# Patient Record
Sex: FEMALE | Race: WHITE | NOT HISPANIC OR LATINO | Employment: OTHER | URBAN - METROPOLITAN AREA
[De-identification: names, ages, dates, MRNs, and addresses within clinical notes are randomized per-mention and may not be internally consistent; named-entity substitution may affect disease eponyms.]

---

## 2019-01-21 NOTE — PROCEDURE NOTE: CLINICAL
PROCEDURE NOTE: Focal Laser, Retina OD. Diagnosis: Diabetic Macular Edema. Anesthesia: Topical. Prior to focal laser, risks/benefits/alternatives to laser discussed including loss of vision and patient wished to proceed. An informed consent was obtained and no assurances or guarantees were given. Spot size: 100 um. Power: 90 mW. Number of pulses: 56. Pulse duration: 70 ms. Procedure Time: 12:08 PM. Patient tolerated procedure well. There were no complications. Post procedure instructions given. Patient given office phone number/answering service number and advised to call immediately should there be loss of vision or pain, or should they have any other questions or concerns. Mckenna Canter

## 2019-01-30 NOTE — PROCEDURE NOTE: CLINICAL
PROCEDURE NOTE: Focal Laser, Retina OS. Diagnosis: Diabetic Macular Edema. Anesthesia: Topical. Prior to focal laser, risks/benefits/alternatives to laser discussed including loss of vision and patient wished to proceed. An informed consent was obtained and no assurances or guarantees were given. Spot size: 100 um. Power: 70 mW. Number of pulses: 63. Pulse duration: 70 ms. Procedure Time: 0393S. Patient tolerated procedure well. There were no complications. Post procedure instructions given. Patient given office phone number/answering service number and advised to call immediately should there be loss of vision or pain, or should they have any other questions or concerns. Lars Kaur

## 2019-05-22 NOTE — PROCEDURE NOTE: CLINICAL
PROCEDURE NOTE: Focal Laser, Retina OS. Diagnosis: Diabetic Macular Edema. Anesthesia: Topical. Prior to focal laser, risks/benefits/alternatives to laser discussed including loss of vision and patient wished to proceed. An informed consent was obtained and no assurances or guarantees were given. Spot size: 100* um. Power: 90* mW. Number of pulses:92 *. Pulse duration:80 * ms. Procedure Time: 1121AM*. Patient tolerated procedure well. There were no complications. Post procedure instructions given. Patient given office phone number/answering service number and advised to call immediately should there be loss of vision or pain, or should they have any other questions or concerns. Rachelle Cortes

## 2019-06-25 NOTE — PROCEDURE NOTE: CLINICAL
PROCEDURE NOTE: Focal Laser, Retina OD. Diagnosis: Diabetic Macular Edema. Anesthesia: Topical. Prior to focal laser, risks/benefits/alternatives to laser discussed including loss of vision and patient wished to proceed. An informed consent was obtained and no assurances or guarantees were given. Spot size: 100* um. Power: 60* mW. Number of pulses: 112*. Pulse duration:70 * ms. Procedure Time:248PM *. Patient tolerated procedure well. There were no complications. Post procedure instructions given. Patient given office phone number/answering service number and advised to call immediately should there be loss of vision or pain, or should they have any other questions or concerns. Rachelle Cortes

## 2019-10-16 NOTE — PROCEDURE NOTE: CLINICAL
PROCEDURE NOTE: Focal Laser, Retina OS. Diagnosis: Diabetic Macular Edema. Anesthesia: Topical. Prior to focal laser, risks/benefits/alternatives to laser discussed including loss of vision and patient wished to proceed. An informed consent was obtained and no assurances or guarantees were given. Spot size: 80* um. Power: 80* mW. Number of pulses: 89*. Pulse duration: 100* ms. Procedure Time: 4:28PM*. Patient tolerated procedure well. There were no complications. Post procedure instructions given. Patient given office phone number/answering service number and advised to call immediately should there be loss of vision or pain, or should they have any other questions or concerns. Marisol Landon

## 2020-01-28 ENCOUNTER — NEW REFERRAL (OUTPATIENT)
Dept: URBAN - METROPOLITAN AREA CLINIC 26 | Facility: CLINIC | Age: 70
End: 2020-01-28

## 2020-01-28 VITALS
HEART RATE: 76 BPM | HEIGHT: 67 IN | DIASTOLIC BLOOD PRESSURE: 66 MMHG | BODY MASS INDEX: 22.76 KG/M2 | SYSTOLIC BLOOD PRESSURE: 122 MMHG | WEIGHT: 145 LBS

## 2020-01-28 DIAGNOSIS — H35.3211: ICD-10-CM

## 2020-01-28 DIAGNOSIS — E11.9: ICD-10-CM

## 2020-01-28 DIAGNOSIS — H43.813: ICD-10-CM

## 2020-01-28 DIAGNOSIS — H35.3122: ICD-10-CM

## 2020-01-28 DIAGNOSIS — H35.711: ICD-10-CM

## 2020-01-28 DIAGNOSIS — H35.371: ICD-10-CM

## 2020-01-28 DIAGNOSIS — D31.31: ICD-10-CM

## 2020-01-28 PROCEDURE — 67028 INJECTION EYE DRUG: CPT

## 2020-01-28 PROCEDURE — 99204 OFFICE O/P NEW MOD 45 MIN: CPT

## 2020-01-28 PROCEDURE — 92250 FUNDUS PHOTOGRAPHY W/I&R: CPT

## 2020-01-28 PROCEDURE — 92235 FLUORESCEIN ANGRPH MLTIFRAME: CPT

## 2020-01-28 PROCEDURE — 92134 CPTRZ OPH DX IMG PST SGM RTA: CPT

## 2020-01-28 ASSESSMENT — VISUAL ACUITY
OS_SC: 20/20+1
OD_CC: CF 1FT
OD_SC: CF 2FT
OS_CC: 20/15

## 2020-01-28 ASSESSMENT — TONOMETRY
OD_IOP_MMHG: 13
OS_IOP_MMHG: 15

## 2020-02-07 NOTE — PROCEDURE NOTE: CLINICAL
PROCEDURE NOTE: Focal Laser, Retina OD. Diagnosis: Diabetic Macular Edema. Anesthesia: Lidocaine 4%. Prior to focal laser, risks/benefits/alternatives to laser discussed including loss of vision and patient wished to proceed. An informed consent was obtained and no assurances or guarantees were given. Spot size: 50 um. Power: 90 mW. Number of pulses: 19. Pulse duration: 5 0 ms. Procedure Time: 6:23 . Patient tolerated procedure well. There were no complications. Post procedure instructions given. Patient given office phone number/answering service number and advised to call immediately should there be loss of vision or pain, or should they have any other questions or concerns. Matthew Steel

## 2020-02-14 NOTE — PROCEDURE NOTE: CLINICAL
PROCEDURE NOTE: Focal Laser, Retina OS. Diagnosis: Diabetes, Type II with Ocular Complications. Anesthesia: Topical. Prior to focal laser, risks/benefits/alternatives to laser discussed including loss of vision and patient wished to proceed. An informed consent was obtained and no assurances or guarantees were given. Spot size: 50 um. Power: 70-90 mW. Number of pulses: 30 . Pulse duration: 50 ms. Procedure Time: 2412M. Patient tolerated procedure well. There were no complications. Post procedure instructions given. Patient given office phone number/answering service number and advised to call immediately should there be loss of vision or pain, or should they have any other questions or concerns. Jesus Osorio

## 2020-03-03 ENCOUNTER — CLINICAL PROCEDURE AND DIAGNOSTIC TESTING ONLY (OUTPATIENT)
Dept: URBAN - METROPOLITAN AREA CLINIC 26 | Facility: CLINIC | Age: 70
End: 2020-03-03

## 2020-03-03 DIAGNOSIS — H35.3211: ICD-10-CM

## 2020-03-03 DIAGNOSIS — H35.3122: ICD-10-CM

## 2020-03-03 PROCEDURE — 67028 INJECTION EYE DRUG: CPT

## 2020-03-03 PROCEDURE — 92250 FUNDUS PHOTOGRAPHY W/I&R: CPT

## 2020-03-03 ASSESSMENT — TONOMETRY: OD_IOP_MMHG: 12

## 2020-03-03 ASSESSMENT — VISUAL ACUITY: OD_CC: CF 2FT

## 2020-04-06 ENCOUNTER — CLINICAL PROCEDURE AND DIAGNOSTIC TESTING ONLY (OUTPATIENT)
Dept: URBAN - METROPOLITAN AREA CLINIC 26 | Facility: CLINIC | Age: 70
End: 2020-04-06

## 2020-04-06 DIAGNOSIS — D31.31: ICD-10-CM

## 2020-04-06 DIAGNOSIS — H35.3122: ICD-10-CM

## 2020-04-06 DIAGNOSIS — H35.3211: ICD-10-CM

## 2020-04-06 PROCEDURE — 92134 CPTRZ OPH DX IMG PST SGM RTA: CPT

## 2020-04-06 PROCEDURE — 92250 FUNDUS PHOTOGRAPHY W/I&R: CPT

## 2020-04-06 PROCEDURE — 67028 INJECTION EYE DRUG: CPT

## 2020-04-06 ASSESSMENT — VISUAL ACUITY: OD_SC: CF 2FT

## 2020-04-06 ASSESSMENT — TONOMETRY: OD_IOP_MMHG: 14

## 2020-05-05 ENCOUNTER — FOLLOW UP AND POST INJECTION EVALUATION (OUTPATIENT)
Dept: URBAN - METROPOLITAN AREA CLINIC 26 | Facility: CLINIC | Age: 70
End: 2020-05-05

## 2020-05-05 DIAGNOSIS — H35.371: ICD-10-CM

## 2020-05-05 DIAGNOSIS — H43.813: ICD-10-CM

## 2020-05-05 DIAGNOSIS — H35.711: ICD-10-CM

## 2020-05-05 DIAGNOSIS — D31.31: ICD-10-CM

## 2020-05-05 DIAGNOSIS — H35.3211: ICD-10-CM

## 2020-05-05 DIAGNOSIS — H35.3122: ICD-10-CM

## 2020-05-05 DIAGNOSIS — E11.9: ICD-10-CM

## 2020-05-05 PROCEDURE — 92250 FUNDUS PHOTOGRAPHY W/I&R: CPT

## 2020-05-05 PROCEDURE — 67028 INJECTION EYE DRUG: CPT

## 2020-05-05 PROCEDURE — 92134 CPTRZ OPH DX IMG PST SGM RTA: CPT

## 2020-05-05 PROCEDURE — 92014 COMPRE OPH EXAM EST PT 1/>: CPT

## 2020-05-05 ASSESSMENT — TONOMETRY
OD_IOP_MMHG: 14
OS_IOP_MMHG: 15

## 2020-05-05 ASSESSMENT — VISUAL ACUITY
OS_CC: 20/20-2
OD_CC: CF 2FT

## 2020-07-09 ENCOUNTER — CLINICAL PROCEDURE AND DIAGNOSTIC TESTING ONLY (OUTPATIENT)
Dept: URBAN - METROPOLITAN AREA CLINIC 26 | Facility: CLINIC | Age: 70
End: 2020-07-09

## 2020-07-09 DIAGNOSIS — H35.371: ICD-10-CM

## 2020-07-09 DIAGNOSIS — H35.3211: ICD-10-CM

## 2020-07-09 DIAGNOSIS — H35.3122: ICD-10-CM

## 2020-07-09 PROCEDURE — 92250 FUNDUS PHOTOGRAPHY W/I&R: CPT

## 2020-07-09 PROCEDURE — 92134 CPTRZ OPH DX IMG PST SGM RTA: CPT

## 2020-07-09 PROCEDURE — 67028 INJECTION EYE DRUG: CPT

## 2020-07-09 ASSESSMENT — VISUAL ACUITY: OD_CC: CF 2FT

## 2020-07-09 ASSESSMENT — TONOMETRY: OD_IOP_MMHG: 9

## 2020-07-30 NOTE — PATIENT DISCUSSION
BLEPHAROSPAMS:  I have explained the nature of this disease process including its pathogenesis, prognosis, and treatment options. For most patients medical therapy is of minimal or no value. For most patients periodic injections of Botox provides significant benefit. I have explained how Botox works and that it is not a cure. Most patients require repeated injections every 3 months. Risks, benefits and alternative of botox discussed with patient. Risks include but are not limited to pain, bleeding, infection, bruising, failure to treat and may cause paralysis. Patient understands and wish to proceed with treatment.

## 2020-07-30 NOTE — PATIENT DISCUSSION
Instructed patient to get Zaditor or Pataday over the counter drops. Instructed patient up them in several times a day. To try that for a week and follow up if its not any better.

## 2020-09-03 ENCOUNTER — CLINICAL PROCEDURE AND DIAGNOSTIC TESTING ONLY (OUTPATIENT)
Dept: URBAN - METROPOLITAN AREA CLINIC 26 | Facility: CLINIC | Age: 70
End: 2020-09-03

## 2020-09-03 DIAGNOSIS — H35.3211: ICD-10-CM

## 2020-09-03 DIAGNOSIS — H35.3122: ICD-10-CM

## 2020-09-03 PROCEDURE — 67028 INJECTION EYE DRUG: CPT

## 2020-09-03 PROCEDURE — 92134 CPTRZ OPH DX IMG PST SGM RTA: CPT

## 2020-09-03 PROCEDURE — 92250 FUNDUS PHOTOGRAPHY W/I&R: CPT

## 2020-09-03 ASSESSMENT — VISUAL ACUITY: OD_CC: 20/400-2

## 2020-09-03 ASSESSMENT — TONOMETRY: OD_IOP_MMHG: 12

## 2020-09-23 NOTE — PATIENT DISCUSSION
Recommended OBSERVATION and MONITORING for progression. shortness of breathe, worsening cough, chills, fevers

## 2020-10-15 ENCOUNTER — OFFICE VISIT (OUTPATIENT)
Dept: URBAN - METROPOLITAN AREA CLINIC 121 | Facility: CLINIC | Age: 70
End: 2020-10-15

## 2020-10-29 ENCOUNTER — FOLLOW UP AND POST INJECTION EVALUATION (OUTPATIENT)
Dept: URBAN - METROPOLITAN AREA CLINIC 26 | Facility: CLINIC | Age: 70
End: 2020-10-29

## 2020-10-29 DIAGNOSIS — E11.9: ICD-10-CM

## 2020-10-29 DIAGNOSIS — H35.371: ICD-10-CM

## 2020-10-29 DIAGNOSIS — H35.3122: ICD-10-CM

## 2020-10-29 DIAGNOSIS — H35.3211: ICD-10-CM

## 2020-10-29 DIAGNOSIS — H43.813: ICD-10-CM

## 2020-10-29 DIAGNOSIS — H35.711: ICD-10-CM

## 2020-10-29 DIAGNOSIS — D31.31: ICD-10-CM

## 2020-10-29 PROCEDURE — 92014 COMPRE OPH EXAM EST PT 1/>: CPT

## 2020-10-29 PROCEDURE — 92134 CPTRZ OPH DX IMG PST SGM RTA: CPT

## 2020-10-29 PROCEDURE — 92250 FUNDUS PHOTOGRAPHY W/I&R: CPT

## 2020-10-29 PROCEDURE — 67028 INJECTION EYE DRUG: CPT

## 2020-10-29 ASSESSMENT — TONOMETRY
OD_IOP_MMHG: 12
OS_IOP_MMHG: 13

## 2020-10-29 ASSESSMENT — VISUAL ACUITY
OS_CC: 20/20
OD_CC: 20/400-1

## 2020-12-10 ENCOUNTER — CLINICAL PROCEDURE AND DIAGNOSTIC TESTING ONLY (OUTPATIENT)
Dept: URBAN - METROPOLITAN AREA CLINIC 26 | Facility: CLINIC | Age: 70
End: 2020-12-10

## 2020-12-10 DIAGNOSIS — H35.3122: ICD-10-CM

## 2020-12-10 DIAGNOSIS — H35.371: ICD-10-CM

## 2020-12-10 DIAGNOSIS — H35.3211: ICD-10-CM

## 2020-12-10 PROCEDURE — 92134 CPTRZ OPH DX IMG PST SGM RTA: CPT

## 2020-12-10 PROCEDURE — 67028 INJECTION EYE DRUG: CPT

## 2020-12-10 PROCEDURE — 92250 FUNDUS PHOTOGRAPHY W/I&R: CPT

## 2020-12-10 ASSESSMENT — TONOMETRY: OD_IOP_MMHG: 13

## 2020-12-10 ASSESSMENT — VISUAL ACUITY: OD_CC: 20/400-1

## 2021-01-20 NOTE — PROCEDURE NOTE: CLINICAL
PROCEDURE NOTE: Focal Laser, Retina OS. Diagnosis: Diabetic Macular Edema. Anesthesia: Topical. Prior to focal laser, risks/benefits/alternatives to laser discussed including loss of vision and patient wished to proceed. An informed consent was obtained and no assurances or guarantees were given. Spot size:100 * um. Power: * mW. Number of pulses: 143*. Pulse duration: 70* ms. Procedure Time: 227PM*. Patient tolerated procedure well. There were no complications. Post procedure instructions given. Patient given office phone number/answering service number and advised to call immediately should there be loss of vision or pain, or should they have any other questions or concerns. Becky Collins

## 2021-01-25 ENCOUNTER — CLINICAL PROCEDURE AND DIAGNOSTIC TESTING ONLY (OUTPATIENT)
Dept: URBAN - METROPOLITAN AREA CLINIC 26 | Facility: CLINIC | Age: 71
End: 2021-01-25

## 2021-01-25 DIAGNOSIS — H35.3122: ICD-10-CM

## 2021-01-25 DIAGNOSIS — H35.3211: ICD-10-CM

## 2021-01-25 PROCEDURE — 92250 FUNDUS PHOTOGRAPHY W/I&R: CPT

## 2021-01-25 PROCEDURE — 67028 INJECTION EYE DRUG: CPT

## 2021-01-25 PROCEDURE — 92134 CPTRZ OPH DX IMG PST SGM RTA: CPT

## 2021-01-25 ASSESSMENT — TONOMETRY: OD_IOP_MMHG: 12

## 2021-01-25 ASSESSMENT — VISUAL ACUITY: OD_SC: 20/400-1

## 2021-02-03 ENCOUNTER — OFFICE VISIT (OUTPATIENT)
Dept: URBAN - METROPOLITAN AREA CLINIC 63 | Facility: CLINIC | Age: 71
End: 2021-02-03

## 2021-02-04 ENCOUNTER — OFFICE VISIT (OUTPATIENT)
Dept: URBAN - METROPOLITAN AREA CLINIC 60 | Facility: CLINIC | Age: 71
End: 2021-02-04

## 2021-03-05 NOTE — PROCEDURE NOTE: CLINICAL
PROCEDURE NOTE: Focal Laser, Retina OD. Diagnosis: Diabetic Macular Edema. Anesthesia: Topical. Prior to focal laser, risks/benefits/alternatives to laser discussed including loss of vision and patient wished to proceed. An informed consent was obtained and no assurances or guarantees were given. Spot size: 100* um. Power: 90* mW. Number of pulses: 55*. Pulse duration:70 * ms. Procedure Time: 332PM*. Patient tolerated procedure well. There were no complications. Post procedure instructions given. Patient given office phone number/answering service number and advised to call immediately should there be loss of vision or pain, or should they have any other questions or concerns. Nathen Arias
4

## 2021-03-08 ENCOUNTER — CLINICAL PROCEDURE AND DIAGNOSTIC TESTING ONLY (OUTPATIENT)
Dept: URBAN - METROPOLITAN AREA CLINIC 26 | Facility: CLINIC | Age: 71
End: 2021-03-08

## 2021-03-08 DIAGNOSIS — H35.3122: ICD-10-CM

## 2021-03-08 DIAGNOSIS — H35.3211: ICD-10-CM

## 2021-03-08 PROCEDURE — 92250 FUNDUS PHOTOGRAPHY W/I&R: CPT

## 2021-03-08 PROCEDURE — 67028 INJECTION EYE DRUG: CPT

## 2021-03-08 ASSESSMENT — VISUAL ACUITY: OD_SC: 20/400-2

## 2021-03-08 ASSESSMENT — TONOMETRY: OD_IOP_MMHG: 15

## 2021-04-15 ENCOUNTER — FOLLOW UP AND POST INJECTION EVALUATION (OUTPATIENT)
Dept: URBAN - METROPOLITAN AREA CLINIC 26 | Facility: CLINIC | Age: 71
End: 2021-04-15

## 2021-04-15 VITALS — HEIGHT: 67 IN | BODY MASS INDEX: 22.76 KG/M2 | WEIGHT: 145 LBS

## 2021-04-15 DIAGNOSIS — D31.31: ICD-10-CM

## 2021-04-15 DIAGNOSIS — H43.813: ICD-10-CM

## 2021-04-15 DIAGNOSIS — H35.3122: ICD-10-CM

## 2021-04-15 DIAGNOSIS — H35.371: ICD-10-CM

## 2021-04-15 DIAGNOSIS — H35.3211: ICD-10-CM

## 2021-04-15 DIAGNOSIS — E11.9: ICD-10-CM

## 2021-04-15 DIAGNOSIS — H35.711: ICD-10-CM

## 2021-04-15 PROCEDURE — 92250 FUNDUS PHOTOGRAPHY W/I&R: CPT

## 2021-04-15 PROCEDURE — 92134 CPTRZ OPH DX IMG PST SGM RTA: CPT

## 2021-04-15 PROCEDURE — 92014 COMPRE OPH EXAM EST PT 1/>: CPT

## 2021-04-15 PROCEDURE — 67028 INJECTION EYE DRUG: CPT

## 2021-04-15 ASSESSMENT — TONOMETRY
OD_IOP_MMHG: 12
OS_IOP_MMHG: 12

## 2021-04-15 ASSESSMENT — VISUAL ACUITY
OS_CC: 20/20
OD_CC: CF 2FT

## 2021-05-20 ENCOUNTER — CLINICAL PROCEDURE AND DIAGNOSTIC TESTING ONLY (OUTPATIENT)
Dept: URBAN - METROPOLITAN AREA CLINIC 26 | Facility: CLINIC | Age: 71
End: 2021-05-20

## 2021-05-20 DIAGNOSIS — H35.3211: ICD-10-CM

## 2021-05-20 DIAGNOSIS — H35.3122: ICD-10-CM

## 2021-05-20 DIAGNOSIS — H35.371: ICD-10-CM

## 2021-05-20 PROCEDURE — 67028 INJECTION EYE DRUG: CPT

## 2021-05-20 PROCEDURE — 92250 FUNDUS PHOTOGRAPHY W/I&R: CPT

## 2021-05-20 PROCEDURE — 92134 CPTRZ OPH DX IMG PST SGM RTA: CPT

## 2021-05-20 ASSESSMENT — VISUAL ACUITY: OD_CC: 20/400-2

## 2021-05-20 ASSESSMENT — TONOMETRY: OD_IOP_MMHG: 13

## 2021-06-23 NOTE — PROCEDURE NOTE: CLINICAL
PROCEDURE NOTE: Focal Laser, Retina OD. Diagnosis: Diabetic Macular Edema. Anesthesia: Topical. Prior to focal laser, risks/benefits/alternatives to laser discussed including loss of vision and patient wished to proceed. An informed consent was obtained and no assurances or guarantees were given. Spot size: 100 um. Power: 80 mW. Number of pulses: 97. Pulse duration: 80 ms. Procedure Time: 2:31PM. Patient tolerated procedure well. There were no complications. Post procedure instructions given. Patient given office phone number/answering service number and advised to call immediately should there be loss of vision or pain, or should they have any other questions or concerns. Orlando Javier

## 2021-06-28 ENCOUNTER — CLINICAL PROCEDURE AND DIAGNOSTIC TESTING ONLY (OUTPATIENT)
Dept: URBAN - METROPOLITAN AREA CLINIC 26 | Facility: CLINIC | Age: 71
End: 2021-06-28

## 2021-06-28 DIAGNOSIS — H35.3122: ICD-10-CM

## 2021-06-28 DIAGNOSIS — H35.3211: ICD-10-CM

## 2021-06-28 DIAGNOSIS — H35.371: ICD-10-CM

## 2021-06-28 PROCEDURE — 92250 FUNDUS PHOTOGRAPHY W/I&R: CPT

## 2021-06-28 PROCEDURE — 67028 INJECTION EYE DRUG: CPT

## 2021-06-28 ASSESSMENT — TONOMETRY
OD_IOP_MMHG: 14
OS_IOP_MMHG: 12

## 2021-06-28 ASSESSMENT — VISUAL ACUITY
OD_CC: 20/400+1
OS_CC: 20/20

## 2021-07-14 NOTE — PROCEDURE NOTE: CLINICAL
PROCEDURE NOTE: Focal Laser, Retina OS. Diagnosis: Diabetic Macular Edema. Anesthesia: Topical. Prior to focal laser, risks/benefits/alternatives to laser discussed including loss of vision and patient wished to proceed. An informed consent was obtained and no assurances or guarantees were given. Spot size: 100* um. Power: 80* mW. Number of pulses: 80*. Pulse duration: 80* ms. Procedure Time: *. Patient tolerated procedure well. There were no complications. Post procedure instructions given. Patient given office phone number/answering service number and advised to call immediately should there be loss of vision or pain, or should they have any other questions or concerns. Rosalina Stubbs

## 2021-08-04 ENCOUNTER — CLINICAL PROCEDURE AND DIAGNOSTIC TESTING ONLY (OUTPATIENT)
Dept: URBAN - METROPOLITAN AREA CLINIC 26 | Facility: CLINIC | Age: 71
End: 2021-08-04

## 2021-08-04 DIAGNOSIS — H35.3122: ICD-10-CM

## 2021-08-04 DIAGNOSIS — H35.3211: ICD-10-CM

## 2021-08-04 DIAGNOSIS — H35.371: ICD-10-CM

## 2021-08-04 PROCEDURE — 92134 CPTRZ OPH DX IMG PST SGM RTA: CPT

## 2021-08-04 PROCEDURE — 92250 FUNDUS PHOTOGRAPHY W/I&R: CPT

## 2021-08-04 PROCEDURE — 67028 INJECTION EYE DRUG: CPT

## 2021-08-04 ASSESSMENT — TONOMETRY: OD_IOP_MMHG: 15

## 2021-08-04 ASSESSMENT — VISUAL ACUITY: OD_SC: 20/400-1

## 2021-09-08 ENCOUNTER — FOLLOW UP AND POST INJECTION EVALUATION (OUTPATIENT)
Dept: URBAN - METROPOLITAN AREA CLINIC 26 | Facility: CLINIC | Age: 71
End: 2021-09-08

## 2021-09-08 DIAGNOSIS — H35.711: ICD-10-CM

## 2021-09-08 DIAGNOSIS — D31.31: ICD-10-CM

## 2021-09-08 DIAGNOSIS — H35.371: ICD-10-CM

## 2021-09-08 DIAGNOSIS — H35.3211: ICD-10-CM

## 2021-09-08 DIAGNOSIS — H35.3122: ICD-10-CM

## 2021-09-08 DIAGNOSIS — E11.9: ICD-10-CM

## 2021-09-08 DIAGNOSIS — H02.836: ICD-10-CM

## 2021-09-08 DIAGNOSIS — H43.813: ICD-10-CM

## 2021-09-08 PROCEDURE — 92134 CPTRZ OPH DX IMG PST SGM RTA: CPT

## 2021-09-08 PROCEDURE — 67028 INJECTION EYE DRUG: CPT

## 2021-09-08 PROCEDURE — 92250 FUNDUS PHOTOGRAPHY W/I&R: CPT

## 2021-09-08 PROCEDURE — 92014 COMPRE OPH EXAM EST PT 1/>: CPT

## 2021-09-08 ASSESSMENT — VISUAL ACUITY
OD_CC: 20/400
OS_CC: 20/20

## 2021-09-08 ASSESSMENT — TONOMETRY
OD_IOP_MMHG: 13
OS_IOP_MMHG: 14

## 2021-10-13 ENCOUNTER — CLINICAL PROCEDURE AND DIAGNOSTIC TESTING ONLY (OUTPATIENT)
Dept: URBAN - METROPOLITAN AREA CLINIC 26 | Facility: CLINIC | Age: 71
End: 2021-10-13

## 2021-10-13 DIAGNOSIS — H35.371: ICD-10-CM

## 2021-10-13 DIAGNOSIS — H35.3122: ICD-10-CM

## 2021-10-13 DIAGNOSIS — H35.3211: ICD-10-CM

## 2021-10-13 PROCEDURE — 92134 CPTRZ OPH DX IMG PST SGM RTA: CPT

## 2021-10-13 PROCEDURE — 92250 FUNDUS PHOTOGRAPHY W/I&R: CPT

## 2021-10-13 PROCEDURE — 67028 INJECTION EYE DRUG: CPT

## 2021-10-13 ASSESSMENT — VISUAL ACUITY: OD_CC: 20/400

## 2021-10-13 ASSESSMENT — TONOMETRY: OD_IOP_MMHG: 10

## 2021-11-17 ENCOUNTER — CLINICAL PROCEDURE AND DIAGNOSTIC TESTING ONLY (OUTPATIENT)
Dept: URBAN - METROPOLITAN AREA CLINIC 26 | Facility: CLINIC | Age: 71
End: 2021-11-17

## 2021-11-17 DIAGNOSIS — H35.3122: ICD-10-CM

## 2021-11-17 DIAGNOSIS — H35.3211: ICD-10-CM

## 2021-11-17 DIAGNOSIS — D31.31: ICD-10-CM

## 2021-11-17 DIAGNOSIS — H35.371: ICD-10-CM

## 2021-11-17 PROCEDURE — 67028 INJECTION EYE DRUG: CPT

## 2021-11-17 PROCEDURE — 92250 FUNDUS PHOTOGRAPHY W/I&R: CPT

## 2021-11-17 PROCEDURE — 92134 CPTRZ OPH DX IMG PST SGM RTA: CPT

## 2021-11-17 ASSESSMENT — TONOMETRY: OD_IOP_MMHG: 13

## 2021-11-17 ASSESSMENT — VISUAL ACUITY: OD_CC: CF 3FT

## 2021-12-21 NOTE — PROCEDURE NOTE: CLINICAL
PROCEDURE NOTE: Focal Laser, Retina OD. Diagnosis: Diabetic Macular Edema. Anesthesia: Topical. Prior to focal laser, risks/benefits/alternatives to laser discussed including loss of vision and patient wished to proceed. An informed consent was obtained and no assurances or guarantees were given. Spot size: *100 um. Power: 100* mW. Number of pulses: 124*. Pulse duration: 80* ms. Procedure Time: 224PM*. Patient tolerated procedure well. There were no complications. Post procedure instructions given. Patient given office phone number/answering service number and advised to call immediately should there be loss of vision or pain, or should they have any other questions or concerns. Koko Hobbs

## 2022-01-12 ENCOUNTER — CLINIC PROCEDURE ONLY (OUTPATIENT)
Dept: URBAN - METROPOLITAN AREA CLINIC 26 | Facility: CLINIC | Age: 72
End: 2022-01-12

## 2022-01-12 DIAGNOSIS — H35.3122: ICD-10-CM

## 2022-01-12 DIAGNOSIS — H35.3211: ICD-10-CM

## 2022-01-12 PROCEDURE — 92250 FUNDUS PHOTOGRAPHY W/I&R: CPT

## 2022-01-12 PROCEDURE — 92134 CPTRZ OPH DX IMG PST SGM RTA: CPT

## 2022-01-12 PROCEDURE — 67028 INJECTION EYE DRUG: CPT

## 2022-01-12 ASSESSMENT — TONOMETRY: OD_IOP_MMHG: 12

## 2022-01-12 ASSESSMENT — VISUAL ACUITY: OD_SC: 20/400

## 2022-02-04 NOTE — PROCEDURE NOTE: CLINICAL
PROCEDURE NOTE: Avastin () OS. Diagnosis: Diabetic Macular Edema. Anesthesia: Lidocaine 4%. Prep: Betadine Drops. Prior to injection, risks/benefits/alternatives discussed including infection, loss of vision, hemorrhage, cataract, glaucoma, retinal tears or detachment. The off-label status of Intravitreal Avastin also was reviewed. The patient wished to proceed with treatment. Topical anesthesia was induced with Alcaine. Additional anesthesia was achieved using drop(s) or injection checked above. a drop of Povidone-iodine 5% ophthalmic solution was instilled over the injection site and in the inferior fornix. Betadine prep was performed. Using the syringe provided, Avastin 1.25 mg in 0.05 cc was injected into the vitreous cavity. The needle was passed 3.0 mm posterior to the limbus in pseudophakic patients, and 3.5 mm posterior to the lumbus in phakic patients. The remainder of the Avastin in the single-use vial was then discarded in a medical waste disposal container. Unused medication was discarded. Patient tolerated procedure well. There were no complications. Injection time: *. Post-op instructions given. Expiration Date: 5/3/2022. The patient was instructed to return for re-evaluation in approximately 4-12 weeks depending on his/her condition and was told to call immediately if vision decreases and/or if his/her eye becomes red, painful, and/or light sensitive. The patient was instructed to go to the emergency room or call 911 if unable to reach the doctor within an hour or two of trying or calling. The patient was instructed to use Artificial Tears q.i.d. p.r.n for comfort. Natanael Marley

## 2022-03-09 ENCOUNTER — CLINIC PROCEDURE ONLY (OUTPATIENT)
Dept: URBAN - METROPOLITAN AREA CLINIC 26 | Facility: CLINIC | Age: 72
End: 2022-03-09

## 2022-03-09 DIAGNOSIS — H35.3211: ICD-10-CM

## 2022-03-09 DIAGNOSIS — H35.3122: ICD-10-CM

## 2022-03-09 PROCEDURE — 92134 CPTRZ OPH DX IMG PST SGM RTA: CPT

## 2022-03-09 PROCEDURE — 67028 INJECTION EYE DRUG: CPT

## 2022-03-09 PROCEDURE — 92250 FUNDUS PHOTOGRAPHY W/I&R: CPT

## 2022-03-09 ASSESSMENT — TONOMETRY: OD_IOP_MMHG: 12

## 2022-03-16 NOTE — PROCEDURE NOTE: CLINICAL
PROCEDURE NOTE: Avastin () OS. Diagnosis: Diabetic Macular Edema. Anesthesia: Lidocaine 4%. Prep: Betadine Drops. Prior to injection, risks/benefits/alternatives discussed including infection, loss of vision, hemorrhage, cataract, glaucoma, retinal tears or detachment. The off-label status of Intravitreal Avastin also was reviewed. The patient wished to proceed with treatment. Topical anesthesia was induced with Alcaine. Additional anesthesia was achieved using drop(s) or injection checked above. a drop of Povidone-iodine 5% ophthalmic solution was instilled over the injection site and in the inferior fornix. Betadine prep was performed. Using the syringe provided, Avastin 1.25 mg in 0.05 cc was injected into the vitreous cavity. The needle was passed 3.0 mm posterior to the limbus in pseudophakic patients, and 3.5 mm posterior to the lumbus in phakic patients. The remainder of the Avastin in the single-use vial was then discarded in a medical waste disposal container. Unused medication was discarded. Patient tolerated procedure well. There were no complications. Injection time: *. Post-op instructions given. Expiration Date: 6/19/2022. The patient was instructed to return for re-evaluation in approximately 4-12 weeks depending on his/her condition and was told to call immediately if vision decreases and/or if his/her eye becomes red, painful, and/or light sensitive. The patient was instructed to go to the emergency room or call 911 if unable to reach the doctor within an hour or two of trying or calling. The patient was instructed to use Artificial Tears q.i.d. p.r.n for comfort. Blue Choi

## 2022-04-28 ENCOUNTER — LAB OUTSIDE AN ENCOUNTER (OUTPATIENT)
Dept: URBAN - METROPOLITAN AREA CLINIC 121 | Facility: CLINIC | Age: 72
End: 2022-04-28

## 2022-05-03 ENCOUNTER — CLINIC PROCEDURE ONLY (OUTPATIENT)
Dept: URBAN - METROPOLITAN AREA CLINIC 26 | Facility: CLINIC | Age: 72
End: 2022-05-03

## 2022-05-03 VITALS — BODY MASS INDEX: 22.76 KG/M2 | HEIGHT: 67 IN | WEIGHT: 145 LBS

## 2022-05-03 DIAGNOSIS — H43.813: ICD-10-CM

## 2022-05-03 DIAGNOSIS — H35.371: ICD-10-CM

## 2022-05-03 DIAGNOSIS — H35.3122: ICD-10-CM

## 2022-05-03 DIAGNOSIS — E11.9: ICD-10-CM

## 2022-05-03 DIAGNOSIS — H35.3211: ICD-10-CM

## 2022-05-03 DIAGNOSIS — D31.31: ICD-10-CM

## 2022-05-03 DIAGNOSIS — H04.123: ICD-10-CM

## 2022-05-03 DIAGNOSIS — H35.711: ICD-10-CM

## 2022-05-03 PROCEDURE — 67028 INJECTION EYE DRUG: CPT

## 2022-05-03 PROCEDURE — 92250 FUNDUS PHOTOGRAPHY W/I&R: CPT

## 2022-05-03 PROCEDURE — 92014 COMPRE OPH EXAM EST PT 1/>: CPT

## 2022-05-03 PROCEDURE — 92134 CPTRZ OPH DX IMG PST SGM RTA: CPT

## 2022-05-03 ASSESSMENT — TONOMETRY
OS_IOP_MMHG: 11
OD_IOP_MMHG: 11

## 2022-05-03 ASSESSMENT — VISUAL ACUITY: OS_CC: 20/30-1

## 2022-05-04 NOTE — PROCEDURE NOTE: CLINICAL
PROCEDURE NOTE: Avastin () OS. Diagnosis: Diabetic Macular Edema. Anesthesia: Lidocaine 4%. Prep: Betadine Drops. Prior to injection, risks/benefits/alternatives discussed including infection, loss of vision, hemorrhage, cataract, glaucoma, retinal tears or detachment. The off-label status of Intravitreal Avastin also was reviewed. The patient wished to proceed with treatment. Topical anesthesia was induced with Alcaine. Additional anesthesia was achieved using drop(s) or injection checked above. a drop of Povidone-iodine 5% ophthalmic solution was instilled over the injection site and in the inferior fornix. Betadine prep was performed. Using the syringe provided, Avastin 1.25 mg in 0.05 cc was injected into the vitreous cavity. The needle was passed 3.0 mm posterior to the limbus in pseudophakic patients, and 3.5 mm posterior to the lumbus in phakic patients. The remainder of the Avastin in the single-use vial was then discarded in a medical waste disposal container. Unused medication was discarded. Patient tolerated procedure well. There were no complications. Injection time: *. Post-op instructions given. Expiration Date: 7/29/2022. The patient was instructed to return for re-evaluation in approximately 4-12 weeks depending on his/her condition and was told to call immediately if vision decreases and/or if his/her eye becomes red, painful, and/or light sensitive. The patient was instructed to go to the emergency room or call 911 if unable to reach the doctor within an hour or two of trying or calling. The patient was instructed to use Artificial Tears q.i.d. p.r.n for comfort. Clifton Garcia

## 2022-05-10 NOTE — PROCEDURE NOTE: CLINICAL
PROCEDURE NOTE: Focal Laser, Retina OD. Diagnosis: Diabetic Macular Edema. Anesthesia: Topical. Prior to focal laser, risks/benefits/alternatives to laser discussed including loss of vision and patient wished to proceed. An informed consent was obtained and no assurances or guarantees were given. Spot size: 100* um. Power: 110* mW. Number of pulses:118 *. Pulse duration: 70* ms. Procedure Time: 331PM*. Patient tolerated procedure well. There were no complications. Post procedure instructions given. Patient given office phone number/answering service number and advised to call immediately should there be loss of vision or pain, or should they have any other questions or concerns. Sherine Farris

## 2022-05-20 LAB
ABSOLUTE BASOPHILS: (no result)
ABSOLUTE EOSINOPHILS: (no result)
ABSOLUTE LYMPHOCYTES: (no result)
ABSOLUTE MONOCYTES: (no result)
ABSOLUTE NEUTROPHILS: (no result)
ALBUMIN/GLOBULIN RATIO: (no result)
ALBUMIN: (no result)
ALKALINE PHOSPHATASE: (no result)
ALPHA FETOPROTEIN, TUMOR MARKER: (no result)
ALT: (no result)
AMMONIA (P): (no result)
AST: (no result)
BASOPHILS: (no result)
BILIRUBIN, TOTAL: (no result)
BUN/CREATININE RATIO: (no result)
CALCIUM: (no result)
CARBON DIOXIDE: (no result)
CHLORIDE: (no result)
CREATININE: (no result)
EGFR AFRICAN AMERIC: (no result)
EGFR NON-AFR. AMERI: (no result)
EOSINOPHILS: (no result)
GLOBULIN: (no result)
GLUCOSE: (no result)
HEMATOCRIT: (no result)
HEMOGLOBIN: (no result)
INR: 0.9
LYMPHOCYTES: (no result)
MCH: (no result)
MCHC: (no result)
MCV: (no result)
MONOCYTES: (no result)
MPV: (no result)
NEUTROPHILS: (no result)
PLATELET COUNT: (no result)
POTASSIUM: (no result)
PROTEIN, TOTAL: (no result)
PT: (no result)
RDW: (no result)
RED BLOOD CELL COUNT: (no result)
SODIUM: (no result)
UREA NITROGEN (BUN): (no result)
WHITE BLOOD CELL COUNT: (no result)

## 2022-05-26 ENCOUNTER — OFFICE VISIT (OUTPATIENT)
Dept: URBAN - METROPOLITAN AREA CLINIC 60 | Facility: CLINIC | Age: 72
End: 2022-05-26

## 2022-06-02 ENCOUNTER — OFFICE VISIT (OUTPATIENT)
Dept: URBAN - METROPOLITAN AREA CLINIC 60 | Facility: CLINIC | Age: 72
End: 2022-06-02

## 2022-06-04 ENCOUNTER — TELEPHONE ENCOUNTER (OUTPATIENT)
Dept: URBAN - METROPOLITAN AREA CLINIC 68 | Facility: CLINIC | Age: 72
End: 2022-06-04

## 2022-06-05 ENCOUNTER — TELEPHONE ENCOUNTER (OUTPATIENT)
Dept: URBAN - METROPOLITAN AREA CLINIC 68 | Facility: CLINIC | Age: 72
End: 2022-06-05

## 2022-06-25 ENCOUNTER — TELEPHONE ENCOUNTER (OUTPATIENT)
Age: 72
End: 2022-06-25

## 2022-06-26 ENCOUNTER — TELEPHONE ENCOUNTER (OUTPATIENT)
Age: 72
End: 2022-06-26

## 2022-06-28 NOTE — PROCEDURE NOTE: CLINICAL
PROCEDURE NOTE: Avastin 1.25mg OS. Diagnosis: Diabetic Macular Edema. Anesthesia: Topical. Prep: Betadine Drops. Prior to injection, risks/benefits/alternatives discussed including infection, loss of vision, hemorrhage, cataract, glaucoma, retinal tears or detachment. The off-label status of Intravitreal Avastin also was reviewed. The patient wished to proceed with treatment. Topical anesthesia was induced with Alcaine. Additional anesthesia was achieved using drop(s) or injection checked above. A drop of Povidone-iodine 5% ophthalmic solution was instilled over the injection site and in the inferior fornix. Betadine prep was performed. Using the syringe provided, Avastin 1.25 mg in 0.05 cc was injected into the vitreous cavity. The needle was passed 3.0 mm posterior to the limbus in pseudophakic patients, and 3.5 mm posterior to the limbus in phakic patients. Patient tolerated procedure well. There were no complications. Injection time: 1:09. Lot #: [id number]. Expiration Date: 8/28/2022. Post-op instructions given. Inventory Id: null. The patient was instructed to return for re-evaluation in approximately 4-12 weeks depending on his/her condition and was told to call immediately if vision decreases and/or if his/her eye becomes red, painful, and/or light sensitive. The patient was instructed to go to the emergency room or call 911 if unable to reach the doctor within an hour or two of trying or calling. The patient was instructed to use Artificial Tears q.i.d. p.r.n for comfort. Orlando Javier

## 2022-07-09 ENCOUNTER — TELEPHONE ENCOUNTER (OUTPATIENT)
Dept: URBAN - METROPOLITAN AREA CLINIC 121 | Facility: CLINIC | Age: 72
End: 2022-07-09

## 2022-07-09 RX ORDER — PNV NO.95/FERROUS FUM/FOLIC AC 28MG-0.8MG
TABLET ORAL
Refills: 0 | OUTPATIENT
Start: 2019-08-07 | End: 2019-09-13

## 2022-07-09 RX ORDER — SIMVASTATIN 10 MG/1
TABLET, FILM COATED ORAL ONCE A DAY
Refills: 0 | OUTPATIENT
Start: 2019-08-07 | End: 2019-09-13

## 2022-07-09 RX ORDER — MULTIVIT,TX WITH IRON,MINERALS
TABLET, EXTENDED RELEASE ORAL ONCE A DAY
Refills: 0 | OUTPATIENT
Start: 2021-02-04 | End: 2022-06-02

## 2022-07-09 RX ORDER — PNV NO.95/FERROUS FUM/FOLIC AC 28MG-0.8MG
TABLET ORAL
Refills: 0 | OUTPATIENT
Start: 2019-06-04 | End: 2019-08-07

## 2022-07-09 RX ORDER — MULTIVIT,TX WITH IRON,MINERALS
TABLET, EXTENDED RELEASE ORAL ONCE A DAY
Refills: 0 | OUTPATIENT
Start: 2019-09-13 | End: 2021-02-04

## 2022-07-09 RX ORDER — TRIAMTERENE AND HYDROCHLOROTHIAZIDE 37.5; 25 MG/1; MG/1
CAPSULE ORAL ONCE A DAY
Refills: 0 | OUTPATIENT
Start: 2019-09-13 | End: 2021-02-04

## 2022-07-09 RX ORDER — ERGOCALCIFEROL (VITAMIN D2) 10 MCG
TABLET ORAL ONCE A DAY
Refills: 0 | OUTPATIENT
Start: 2021-02-04 | End: 2022-06-02

## 2022-07-09 RX ORDER — LISINOPRIL 5 MG/1
TABLET ORAL ONCE A DAY
Refills: 0 | OUTPATIENT
Start: 2019-08-07 | End: 2019-09-13

## 2022-07-09 RX ORDER — TRIAMTERENE AND HYDROCHLOROTHIAZIDE 37.5; 25 MG/1; MG/1
CAPSULE ORAL ONCE A DAY
Refills: 0 | OUTPATIENT
Start: 2019-06-04 | End: 2019-08-07

## 2022-07-09 RX ORDER — METFORMIN HCL 500 MG/1
TABLET ORAL ONCE A DAY
Refills: 0 | OUTPATIENT
Start: 2019-09-13 | End: 2021-02-04

## 2022-07-09 RX ORDER — SIMVASTATIN 10 MG/1
TABLET, FILM COATED ORAL ONCE A DAY
Refills: 0 | OUTPATIENT
Start: 2019-09-13 | End: 2021-02-04

## 2022-07-09 RX ORDER — LISINOPRIL 30 MG/1
TABLET ORAL ONCE A DAY
Refills: 0 | OUTPATIENT
Start: 2021-02-04 | End: 2022-06-02

## 2022-07-09 RX ORDER — LEVOTHYROXINE SODIUM 150 UG/1
TABLET ORAL ONCE A DAY
Refills: 0 | OUTPATIENT
Start: 2019-08-07 | End: 2019-09-13

## 2022-07-09 RX ORDER — URSODIOL 300 MG/1
TAKE TWO CAPS QAM AND ONE CAP QHS CAPSULE ORAL THREE TIMES A DAY
Refills: 0 | OUTPATIENT
Start: 2022-04-28 | End: 2022-06-02

## 2022-07-09 RX ORDER — ASPIRIN 325 MG/1
TABLET ORAL ONCE A DAY
Refills: 0 | OUTPATIENT
Start: 2021-02-04 | End: 2022-06-02

## 2022-07-09 RX ORDER — URSODIOL 300 MG/1
CAPSULE ORAL TAKE AS DIRECTED
Refills: 0 | OUTPATIENT
Start: 2019-06-04 | End: 2019-08-07

## 2022-07-09 RX ORDER — URSODIOL 300 MG/1
TAKE TWO CAPS QAM AND ONE CAP QHS CAPSULE ORAL THREE TIMES A DAY
Refills: 3 | OUTPATIENT
Start: 2019-08-07 | End: 2019-11-04

## 2022-07-09 RX ORDER — SIMVASTATIN 10 MG/1
TABLET, FILM COATED ORAL ONCE A DAY
Refills: 0 | OUTPATIENT
Start: 2019-06-04 | End: 2019-08-07

## 2022-07-09 RX ORDER — METFORMIN HCL 500 MG/1
TABLET ORAL ONCE A DAY
Refills: 0 | OUTPATIENT
Start: 2019-06-04 | End: 2019-08-07

## 2022-07-09 RX ORDER — METFORMIN HCL 500 MG/1
TABLET ORAL ONCE A DAY
Refills: 0 | OUTPATIENT
Start: 2019-08-07 | End: 2019-09-13

## 2022-07-09 RX ORDER — METFORMIN HCL 500 MG/1
TABLET ORAL ONCE A DAY
Refills: 0 | OUTPATIENT
Start: 2021-02-04 | End: 2022-06-02

## 2022-07-09 RX ORDER — LEVOTHYROXINE SODIUM 150 UG/1
TABLET ORAL ONCE A DAY
Refills: 0 | OUTPATIENT
Start: 2021-02-04 | End: 2022-06-02

## 2022-07-09 RX ORDER — MULTIVIT,TX WITH IRON,MINERALS
TABLET, EXTENDED RELEASE ORAL ONCE A DAY
Refills: 0 | OUTPATIENT
Start: 2019-06-04 | End: 2019-08-07

## 2022-07-09 RX ORDER — URSODIOL 300 MG/1
TAKE TWO CAPS QAM AND ONE CAP QHS CAPSULE ORAL THREE TIMES A DAY
Refills: 0 | OUTPATIENT
Start: 2022-01-11 | End: 2022-04-28

## 2022-07-09 RX ORDER — DILTIAZEM HYDROCHLORIDE 240 MG/1
CAPSULE, EXTENDED RELEASE ORAL ONCE A DAY
Refills: 0 | OUTPATIENT
Start: 2021-02-04 | End: 2022-06-02

## 2022-07-09 RX ORDER — URSODIOL 300 MG/1
TAKE TWO CAPS QAM AND ONE CAP QHS CAPSULE ORAL THREE TIMES A DAY
Refills: 3 | OUTPATIENT
Start: 2021-02-04 | End: 2022-01-11

## 2022-07-09 RX ORDER — MULTIVIT,TX WITH IRON,MINERALS
TABLET, EXTENDED RELEASE ORAL ONCE A DAY
Refills: 0 | OUTPATIENT
Start: 2019-08-07 | End: 2019-09-13

## 2022-07-09 RX ORDER — PNV NO.95/FERROUS FUM/FOLIC AC 28MG-0.8MG
TABLET ORAL
Refills: 0 | OUTPATIENT
Start: 2019-09-13 | End: 2021-02-04

## 2022-07-09 RX ORDER — ERGOCALCIFEROL (VITAMIN D2) 10 MCG
TABLET ORAL ONCE A DAY
Refills: 0 | OUTPATIENT
Start: 2019-09-13 | End: 2021-02-04

## 2022-07-09 RX ORDER — ASPIRIN 325 MG/1
TABLET ORAL ONCE A DAY
Refills: 0 | OUTPATIENT
Start: 2019-08-07 | End: 2019-09-13

## 2022-07-09 RX ORDER — URSODIOL 300 MG/1
CAPSULE ORAL TAKE AS DIRECTED
Refills: 0 | OUTPATIENT
Start: 2019-08-07 | End: 2019-09-13

## 2022-07-09 RX ORDER — TRIAMTERENE AND HYDROCHLOROTHIAZIDE 37.5; 25 MG/1; MG/1
CAPSULE ORAL ONCE A DAY
Refills: 0 | OUTPATIENT
Start: 2019-08-07 | End: 2019-09-13

## 2022-07-09 RX ORDER — LEVOTHYROXINE SODIUM 150 UG/1
TABLET ORAL ONCE A DAY
Refills: 0 | OUTPATIENT
Start: 2019-06-04 | End: 2019-08-07

## 2022-07-09 RX ORDER — LISINOPRIL 5 MG/1
TABLET ORAL ONCE A DAY
Refills: 0 | OUTPATIENT
Start: 2019-09-13 | End: 2021-02-04

## 2022-07-09 RX ORDER — URSODIOL 300 MG/1
TAKE TWO CAPS QAM AND ONE CAP QHS CAPSULE ORAL THREE TIMES A DAY
Refills: 0 | OUTPATIENT
Start: 2020-09-10 | End: 2021-02-04

## 2022-07-09 RX ORDER — PNV NO.95/FERROUS FUM/FOLIC AC 28MG-0.8MG
TABLET ORAL
Refills: 0 | OUTPATIENT
Start: 2021-02-04 | End: 2022-06-02

## 2022-07-09 RX ORDER — DILTIAZEM HYDROCHLORIDE 240 MG/1
CAPSULE, EXTENDED RELEASE ORAL ONCE A DAY
Refills: 0 | OUTPATIENT
Start: 2019-08-07 | End: 2019-09-13

## 2022-07-09 RX ORDER — ASPIRIN 325 MG/1
TABLET ORAL ONCE A DAY
Refills: 0 | OUTPATIENT
Start: 2019-09-13 | End: 2021-02-04

## 2022-07-09 RX ORDER — DILTIAZEM HYDROCHLORIDE 240 MG/1
CAPSULE, EXTENDED RELEASE ORAL ONCE A DAY
Refills: 0 | OUTPATIENT
Start: 2019-09-13 | End: 2021-02-04

## 2022-07-09 RX ORDER — ASPIRIN 325 MG/1
TABLET ORAL ONCE A DAY
Refills: 0 | OUTPATIENT
Start: 2019-07-15 | End: 2019-08-07

## 2022-07-09 RX ORDER — DILTIAZEM HYDROCHLORIDE 240 MG/1
CAPSULE, EXTENDED RELEASE ORAL ONCE A DAY
Refills: 0 | OUTPATIENT
Start: 2019-06-04 | End: 2019-08-07

## 2022-07-09 RX ORDER — LEVOTHYROXINE SODIUM 150 UG/1
TABLET ORAL ONCE A DAY
Refills: 0 | OUTPATIENT
Start: 2019-09-13 | End: 2021-02-04

## 2022-07-09 RX ORDER — ERGOCALCIFEROL (VITAMIN D2) 10 MCG
TABLET ORAL ONCE A DAY
Refills: 0 | OUTPATIENT
Start: 2019-06-04 | End: 2019-08-07

## 2022-07-09 RX ORDER — URSODIOL 300 MG/1
TAKE TWO CAPS QAM AND ONE CAP QHS CAPSULE ORAL THREE TIMES A DAY
Refills: 3 | OUTPATIENT
Start: 2019-11-04 | End: 2020-09-10

## 2022-07-09 RX ORDER — LISINOPRIL 5 MG/1
TABLET ORAL ONCE A DAY
Refills: 0 | OUTPATIENT
Start: 2019-06-04 | End: 2019-08-07

## 2022-07-09 RX ORDER — SIMVASTATIN 10 MG/1
TABLET, FILM COATED ORAL ONCE A DAY
Refills: 0 | OUTPATIENT
Start: 2021-02-04 | End: 2022-06-02

## 2022-07-09 RX ORDER — ERGOCALCIFEROL (VITAMIN D2) 10 MCG
TABLET ORAL ONCE A DAY
Refills: 0 | OUTPATIENT
Start: 2019-08-07 | End: 2019-09-13

## 2022-07-09 RX ORDER — TRIAMTERENE AND HYDROCHLOROTHIAZIDE 37.5; 25 MG/1; MG/1
CAPSULE ORAL ONCE A DAY
Refills: 0 | OUTPATIENT
Start: 2021-02-04 | End: 2021-02-04

## 2022-07-09 RX ORDER — LISINOPRIL 5 MG/1
TABLET ORAL ONCE A DAY
Refills: 0 | OUTPATIENT
Start: 2021-02-04 | End: 2021-02-04

## 2022-07-10 ENCOUNTER — TELEPHONE ENCOUNTER (OUTPATIENT)
Dept: URBAN - METROPOLITAN AREA CLINIC 121 | Facility: CLINIC | Age: 72
End: 2022-07-10

## 2022-07-10 RX ORDER — DILTIAZEM HYDROCHLORIDE 240 MG/1
CAPSULE, EXTENDED RELEASE ORAL ONCE A DAY
Refills: 0 | Status: ACTIVE | COMMUNITY
Start: 2022-06-02

## 2022-07-10 RX ORDER — FLECAINIDE ACETATE 100 MG/1
TABLET ORAL TAKE AS DIRECTED
Refills: 0 | Status: ACTIVE | COMMUNITY
Start: 2022-06-02

## 2022-07-10 RX ORDER — LISINOPRIL 30 MG/1
TABLET ORAL ONCE A DAY
Refills: 0 | Status: ACTIVE | COMMUNITY
Start: 2022-06-02

## 2022-07-10 RX ORDER — URSODIOL 300 MG/1
TAKE TWO CAPS QAM AND ONE CAP QHS CAPSULE ORAL THREE TIMES A DAY
Refills: 3 | Status: ACTIVE | COMMUNITY
Start: 2021-02-04

## 2022-07-10 RX ORDER — PNV NO.95/FERROUS FUM/FOLIC AC 28MG-0.8MG
TABLET ORAL
Refills: 0 | Status: ACTIVE | COMMUNITY
Start: 2022-06-02

## 2022-07-10 RX ORDER — SIMVASTATIN 10 MG/1
TABLET, FILM COATED ORAL ONCE A DAY
Refills: 0 | Status: ACTIVE | COMMUNITY
Start: 2022-06-02

## 2022-07-10 RX ORDER — ONDANSETRON 4 MG/1
TAKE AS DIRECTED TAKE FIRST TABLET 30 MIN PRIOR TO STARTING BOWEL PREP, THEN SEND TABLET 30 MIN PRIOR TO SECOND DOSE TABLET, ORALLY DISINTEGRATING ORAL TAKE AS DIRECTED
Refills: 0 | Status: ACTIVE | COMMUNITY
Start: 2019-08-07

## 2022-07-10 RX ORDER — ERGOCALCIFEROL (VITAMIN D2) 10 MCG
TABLET ORAL ONCE A DAY
Refills: 0 | Status: ACTIVE | COMMUNITY
Start: 2022-06-02

## 2022-07-10 RX ORDER — LEVOTHYROXINE SODIUM 150 UG/1
TABLET ORAL ONCE A DAY
Refills: 0 | Status: ACTIVE | COMMUNITY
Start: 2022-06-02

## 2022-07-10 RX ORDER — ASPIRIN 325 MG/1
TABLET ORAL ONCE A DAY
Refills: 0 | Status: ACTIVE | COMMUNITY
Start: 2022-06-02

## 2022-07-10 RX ORDER — AMMONIUM LACTATE 12 %
CREAM (GRAM) TOPICAL TAKE AS DIRECTED
Refills: 0 | Status: ACTIVE | COMMUNITY
Start: 2022-06-02

## 2022-07-10 RX ORDER — METFORMIN HCL 500 MG/1
TABLET ORAL ONCE A DAY
Refills: 0 | Status: ACTIVE | COMMUNITY
Start: 2022-06-02

## 2022-07-10 RX ORDER — HYDROCODONE BITARTRATE AND ACETAMINOPHEN 5; 325 MG/1; MG/1
PRN TABLET ORAL TAKE AS DIRECTED
Refills: 0 | Status: ACTIVE | COMMUNITY
Start: 2022-06-02

## 2022-07-10 RX ORDER — URSODIOL 300 MG/1
TAKE TWO CAPS QAM AND ONE CAP QHS CAPSULE ORAL THREE TIMES A DAY
Refills: 3 | Status: ACTIVE | COMMUNITY
Start: 2022-06-02

## 2022-07-12 ENCOUNTER — CLINIC PROCEDURE ONLY (OUTPATIENT)
Dept: URBAN - METROPOLITAN AREA CLINIC 26 | Facility: CLINIC | Age: 72
End: 2022-07-12

## 2022-07-12 DIAGNOSIS — H35.371: ICD-10-CM

## 2022-07-12 DIAGNOSIS — D31.31: ICD-10-CM

## 2022-07-12 DIAGNOSIS — H35.3122: ICD-10-CM

## 2022-07-12 DIAGNOSIS — H35.3211: ICD-10-CM

## 2022-07-12 PROCEDURE — 92134 CPTRZ OPH DX IMG PST SGM RTA: CPT

## 2022-07-12 PROCEDURE — 92250 FUNDUS PHOTOGRAPHY W/I&R: CPT

## 2022-07-12 PROCEDURE — 67028 INJECTION EYE DRUG: CPT

## 2022-07-12 ASSESSMENT — TONOMETRY: OD_IOP_MMHG: 12

## 2022-07-30 ENCOUNTER — TELEPHONE ENCOUNTER (OUTPATIENT)
Age: 72
End: 2022-07-30

## 2022-07-31 ENCOUNTER — TELEPHONE ENCOUNTER (OUTPATIENT)
Age: 72
End: 2022-07-31

## 2022-07-31 RX ORDER — URSODIOL 300 MG/1
3 (THREE) CAPSULE ORAL
Qty: 0 | Refills: 2 | Status: ACTIVE | COMMUNITY
Start: 2018-11-14

## 2022-07-31 RX ORDER — URSODIOL 300 MG/1
3 (THREE) CAPSULE ORAL
Qty: 0 | Refills: 3 | Status: ACTIVE | COMMUNITY
Start: 2018-10-25

## 2022-07-31 RX ORDER — URSODIOL 300 MG/1
1 (ONE) CAPSULE ORAL
Qty: 0 | Refills: 5 | Status: ACTIVE | COMMUNITY
Start: 2018-10-19

## 2022-07-31 RX ORDER — URSODIOL 300 MG/1
2 (TWO) CAPSULE ORAL
Qty: 0 | Refills: 4 | Status: ACTIVE | COMMUNITY
Start: 2018-10-22

## 2022-07-31 RX ORDER — URSODIOL 300 MG/1
3 (THREE) CAPSULE ORAL
Qty: 0 | Refills: 3 | Status: ACTIVE | COMMUNITY
Start: 2018-10-28

## 2022-08-23 NOTE — PROCEDURE NOTE: CLINICAL
PROCEDURE NOTE: Avastin 1.25mg OS. Diagnosis: Diabetic Macular Edema. Anesthesia: Lidocaine 4%. Prep: Betadine Drops. Prior to injection, risks/benefits/alternatives discussed including infection, loss of vision, hemorrhage, cataract, glaucoma, retinal tears or detachment. The off-label status of Intravitreal Avastin also was reviewed. The patient wished to proceed with treatment. Topical anesthesia was induced with Alcaine. Additional anesthesia was achieved using drop(s) or injection checked above. A drop of Povidone-iodine 5% ophthalmic solution was instilled over the injection site and in the inferior fornix. Betadine prep was performed. Using the syringe provided, Avastin 1.25 mg in 0.05 cc was injected into the vitreous cavity. The needle was passed 3.0 mm posterior to the limbus in pseudophakic patients, and 3.5 mm posterior to the limbus in phakic patients. Patient tolerated procedure well. There were no complications. Injection time: *. Lot #: [id number]. Expiration Date: 11/4/2022. Post-op instructions given. Inventory Id: null. The patient was instructed to return for re-evaluation in approximately 4-12 weeks depending on his/her condition and was told to call immediately if vision decreases and/or if his/her eye becomes red, painful, and/or light sensitive. The patient was instructed to go to the emergency room or call 911 if unable to reach the doctor within an hour or two of trying or calling. The patient was instructed to use Artificial Tears q.i.d. p.r.n for comfort. Lidia Sultana

## 2022-09-13 ENCOUNTER — CLINIC PROCEDURE ONLY (OUTPATIENT)
Dept: URBAN - METROPOLITAN AREA CLINIC 26 | Facility: CLINIC | Age: 72
End: 2022-09-13

## 2022-09-13 DIAGNOSIS — H35.3122: ICD-10-CM

## 2022-09-13 DIAGNOSIS — H35.371: ICD-10-CM

## 2022-09-13 DIAGNOSIS — H35.3211: ICD-10-CM

## 2022-09-13 PROCEDURE — 67028 INJECTION EYE DRUG: CPT

## 2022-09-13 PROCEDURE — 92134 CPTRZ OPH DX IMG PST SGM RTA: CPT

## 2022-09-13 ASSESSMENT — TONOMETRY: OD_IOP_MMHG: 12

## 2022-10-12 NOTE — PROCEDURE NOTE: CLINICAL
PROCEDURE NOTE: Focal Laser, Retina OS. Diagnosis: Diabetic Macular Edema. Anesthesia: Topical. Prior to focal laser, risks/benefits/alternatives to laser discussed including loss of vision and patient wished to proceed. An informed consent was obtained and no assurances or guarantees were given. Spot size: 100 um. Power: 70 mW. Number of pulses: 67. Pulse duration: 70 ms. Procedure Time: 4:15PM. Patient tolerated procedure well. There were no complications. Post procedure instructions given. Patient given office phone number/answering service number and advised to call immediately should there be loss of vision or pain, or should they have any other questions or concerns. Lidia Sultana

## 2022-10-26 NOTE — PROCEDURE NOTE: CLINICAL
PROCEDURE NOTE: Focal Laser, Retina OD. Diagnosis: Diabetic Macular Edema. Anesthesia: Topical. Prior to focal laser, risks/benefits/alternatives to laser discussed including loss of vision and patient wished to proceed. An informed consent was obtained and no assurances or guarantees were given. Spot size: 100* um. Power:100 * mW. Number of pulses: 150*. Pulse duration: 80* ms. Procedure Time: 333PM*. Patient tolerated procedure well. There were no complications. Post procedure instructions given. Patient given office phone number/answering service number and advised to call immediately should there be loss of vision or pain, or should they have any other questions or concerns. Jhoan Soliz

## 2022-11-07 ENCOUNTER — CLINIC PROCEDURE ONLY (OUTPATIENT)
Dept: URBAN - METROPOLITAN AREA CLINIC 26 | Facility: CLINIC | Age: 72
End: 2022-11-07

## 2022-11-07 DIAGNOSIS — D31.31: ICD-10-CM

## 2022-11-07 DIAGNOSIS — H35.3211: ICD-10-CM

## 2022-11-07 DIAGNOSIS — H35.3122: ICD-10-CM

## 2022-11-07 DIAGNOSIS — H35.371: ICD-10-CM

## 2022-11-07 PROCEDURE — 92250 FUNDUS PHOTOGRAPHY W/I&R: CPT

## 2022-11-07 PROCEDURE — 67028 INJECTION EYE DRUG: CPT

## 2022-11-07 PROCEDURE — 92134 CPTRZ OPH DX IMG PST SGM RTA: CPT

## 2022-11-07 ASSESSMENT — TONOMETRY: OD_IOP_MMHG: 11

## 2022-12-07 NOTE — PROCEDURE NOTE: CLINICAL
PROCEDURE NOTE: Avastin () #1 OD. Diagnosis: Diabetic Macular Edema. Anesthesia: Lidocaine 4%. Prep: Betadine Drops. Prior to injection, risks/benefits/alternatives discussed including infection, loss of vision, hemorrhage, cataract, glaucoma, retinal tears or detachment. The off-label status of Intravitreal Avastin also was reviewed. The patient wished to proceed with treatment. Topical anesthesia was induced with Alcaine. Additional anesthesia was achieved using drop(s) or injection checked above. a drop of Povidone-iodine 5% ophthalmic solution was instilled over the injection site and in the inferior fornix. Betadine prep was performed. Using the syringe provided, Avastin 1.25 mg in 0.05 cc was injected into the vitreous cavity. The needle was passed 3.0 mm posterior to the limbus in pseudophakic patients, and 3.5 mm posterior to the lumbus in phakic patients. The remainder of the Avastin in the single-use vial was then discarded in a medical waste disposal container. Unused medication was discarded. Patient tolerated procedure well. There were no complications. Injection time: *. Post-op instructions given. Expiration Date: 2/16/2023. The patient was instructed to return for re-evaluation in approximately 4-12 weeks depending on his/her condition and was told to call immediately if vision decreases and/or if his/her eye becomes red, painful, and/or light sensitive. The patient was instructed to go to the emergency room or call 911 if unable to reach the doctor within an hour or two of trying or calling. The patient was instructed to use Artificial Tears q.i.d. p.r.n for comfort. Cindy Kelly PROCEDURE NOTE: Avastin () OS. Diagnosis: Diabetic Macular Edema. Anesthesia: Lidocaine 4%. Prep: Betadine Drops. Prior to injection, risks/benefits/alternatives discussed including infection, loss of vision, hemorrhage, cataract, glaucoma, retinal tears or detachment. The off-label status of Intravitreal Avastin also was reviewed. The patient wished to proceed with treatment. Topical anesthesia was induced with Alcaine. Additional anesthesia was achieved using drop(s) or injection checked above. a drop of Povidone-iodine 5% ophthalmic solution was instilled over the injection site and in the inferior fornix. Betadine prep was performed. Using the syringe provided, Avastin 1.25 mg in 0.05 cc was injected into the vitreous cavity. The needle was passed 3.0 mm posterior to the limbus in pseudophakic patients, and 3.5 mm posterior to the lumbus in phakic patients. The remainder of the Avastin in the single-use vial was then discarded in a medical waste disposal container. Unused medication was discarded. Patient tolerated procedure well. There were no complications. Injection time: *. Post-op instructions given. Expiration Date: 2/24/2023. The patient was instructed to return for re-evaluation in approximately 4-12 weeks depending on his/her condition and was told to call immediately if vision decreases and/or if his/her eye becomes red, painful, and/or light sensitive. The patient was instructed to go to the emergency room or call 911 if unable to reach the doctor within an hour or two of trying or calling. The patient was instructed to use Artificial Tears q.i.d. p.r.n for comfort. Cindy Kelly

## 2023-01-06 ENCOUNTER — FOLLOW UP (OUTPATIENT)
Dept: URBAN - METROPOLITAN AREA CLINIC 26 | Facility: CLINIC | Age: 73
End: 2023-01-06

## 2023-01-06 DIAGNOSIS — H35.3211: ICD-10-CM

## 2023-01-06 DIAGNOSIS — H43.813: ICD-10-CM

## 2023-01-06 DIAGNOSIS — H04.123: ICD-10-CM

## 2023-01-06 DIAGNOSIS — H35.3122: ICD-10-CM

## 2023-01-06 DIAGNOSIS — H35.711: ICD-10-CM

## 2023-01-06 DIAGNOSIS — H35.371: ICD-10-CM

## 2023-01-06 DIAGNOSIS — E11.9: ICD-10-CM

## 2023-01-06 DIAGNOSIS — D31.31: ICD-10-CM

## 2023-01-06 PROCEDURE — 92014 COMPRE OPH EXAM EST PT 1/>: CPT

## 2023-01-06 PROCEDURE — 92250 FUNDUS PHOTOGRAPHY W/I&R: CPT

## 2023-01-06 PROCEDURE — 67028 INJECTION EYE DRUG: CPT

## 2023-01-06 PROCEDURE — 92134 CPTRZ OPH DX IMG PST SGM RTA: CPT

## 2023-01-06 ASSESSMENT — VISUAL ACUITY
OS_SC: 20/25-1
OD_SC: 20/400-2

## 2023-01-06 ASSESSMENT — TONOMETRY
OS_IOP_MMHG: 12
OD_IOP_MMHG: 13

## 2023-01-25 NOTE — PROCEDURE NOTE: CLINICAL
PROCEDURE NOTE: Avastin () OD. Diagnosis: Diabetic Macular Edema. Anesthesia: Lidocaine 4%. Prep: Betadine Drops. Prior to injection, risks/benefits/alternatives discussed including infection, loss of vision, hemorrhage, cataract, glaucoma, retinal tears or detachment. The off-label status of Intravitreal Avastin also was reviewed. The patient wished to proceed with treatment. Topical anesthesia was induced with Alcaine. Additional anesthesia was achieved using drop(s) or injection checked above. a drop of Povidone-iodine 5% ophthalmic solution was instilled over the injection site and in the inferior fornix. Betadine prep was performed. Using the syringe provided, Avastin 1.25 mg in 0.05 cc was injected into the vitreous cavity. The needle was passed 3.0 mm posterior to the limbus in pseudophakic patients, and 3.5 mm posterior to the lumbus in phakic patients. The remainder of the Avastin in the single-use vial was then discarded in a medical waste disposal container. Unused medication was discarded. Patient tolerated procedure well. There were no complications. Injection time: *. Post-op instructions given. Expiration Date: 4/12/2023. The patient was instructed to return for re-evaluation in approximately 4-12 weeks depending on his/her condition and was told to call immediately if vision decreases and/or if his/her eye becomes red, painful, and/or light sensitive. The patient was instructed to go to the emergency room or call 911 if unable to reach the doctor within an hour or two of trying or calling. The patient was instructed to use Artificial Tears q.i.d. p.r.n for comfort. Lars Kaur PROCEDURE NOTE: Avastin () OS. Diagnosis: Diabetic Macular Edema. Anesthesia: Lidocaine 4%. Prep: Betadine Drops. Prior to injection, risks/benefits/alternatives discussed including infection, loss of vision, hemorrhage, cataract, glaucoma, retinal tears or detachment. The off-label status of Intravitreal Avastin also was reviewed. The patient wished to proceed with treatment. Topical anesthesia was induced with Alcaine. Additional anesthesia was achieved using drop(s) or injection checked above. a drop of Povidone-iodine 5% ophthalmic solution was instilled over the injection site and in the inferior fornix. Betadine prep was performed. Using the syringe provided, Avastin 1.25 mg in 0.05 cc was injected into the vitreous cavity. The needle was passed 3.0 mm posterior to the limbus in pseudophakic patients, and 3.5 mm posterior to the lumbus in phakic patients. The remainder of the Avastin in the single-use vial was then discarded in a medical waste disposal container. Unused medication was discarded. Patient tolerated procedure well. There were no complications. Injection time: *. Post-op instructions given. Expiration Date: 4/8/2023. The patient was instructed to return for re-evaluation in approximately 4-12 weeks depending on his/her condition and was told to call immediately if vision decreases and/or if his/her eye becomes red, painful, and/or light sensitive. The patient was instructed to go to the emergency room or call 911 if unable to reach the doctor within an hour or two of trying or calling. The patient was instructed to use Artificial Tears q.i.d. p.r.n for comfort. Lars Kaur

## 2023-02-18 NOTE — PATIENT DISCUSSION
2:05 AM    Hospitalist called regarding my colleague's patient.  I was asked by anesthesiologist to follow-up on patient's potassium and hemoglobin results. K came back ok at 3.4. Hgb not back yet, I don't see this in process, will have RN draw AM CBC off new PICC early.    BP 99/48 (BP Location: Left arm)   Pulse 93   Temp 97.1  F (36.2  C) (Oral)   Resp 16   Wt 78.1 kg (172 lb 2.9 oz)   SpO2 95%   BMI 26.97 kg/m      Bronwyn Colby MD  [facility] Medicine Service   9/23/20 hga1c 12. Jesus Lean Jesus Lee Lean

## 2023-02-28 NOTE — PATIENT DISCUSSION
MEDICARE WELLNESS VISIT + NOTE    CHIEF COMPLAINT:  Brayan Padilla presents for her Subsequent Annual Medicare Wellness Visit.   Her additional complaints or concerns are addressed below.     Patient Care Team:  Milena Borden MD as PCP - General (Family Practice)  Kimberly Montelongo DO as Endocrinologist (Internal Medicine - Endocrinology,Diabetes,Metabolism)        Patient Active Problem List   Diagnosis   • Vasomotor symptoms due to menopause   • Hypothyroidism         Past Medical History:   Diagnosis Date   • Hypothyroidism    • Ovarian cyst     s/p hysterectomy   • Vasomotor symptoms due to menopause          Past Surgical History:   Procedure Laterality Date   • Tonsillectomy  2009   • Total abdom hysterectomy  02/2009         Social History     Tobacco Use   • Smoking status: Never   • Smokeless tobacco: Never   Vaping Use   • Vaping Use: never used   Substance Use Topics   • Alcohol use: Never   • Drug use: Never     Family History   Adopted: Yes   Problem Relation Age of Onset   • Rheumatoid Arthritis Daughter    • Patient is unaware of any medical problems Son         Motorcycle accident at 24         Current Outpatient Medications   Medication Sig Dispense Refill   • estradiol (ESTRACE) 0.5 MG tablet Take 0.5 tablets by mouth daily. 45 tablet 3   • levothyroxine 50 MCG tablet Take 1 tablet by mouth daily. 90 tablet 3   • hydroCORTisone (CORTIZONE) 2.5 % cream Apply 1 application topically 2 times daily as needed (dry patches). 28 g 1     No current facility-administered medications for this visit.        The following items on the Medicare Health Risk Assessment were found to be positive  6 b.) How many servings of High Fiber / Whole Grain Foods to you have each day ( 1 serving = 1 cup cold cereal, 1/2 cup cooked cereal, 1 slice bread): 1 per day     11d.) Bodily pain: Often         Vision and Hearing screens: Not performed    Advance care planning documents on file - yes    Cognitive/Functional Status:  POST FOCAL LASER. no evidence of cognitive dysfunction by direct observation    Opioid Review: Brayan is not taking opioid medications.    Recent PHQ 2/9 Score:    PHQ 2:  Date Adult PHQ 2 Score Adult PHQ 2 Interpretation   2/28/2023 0 No further screening needed       PHQ 9:       DEPRESSION ASSESSMENT/PLAN:  Depression screening is negative no further plan needed.     Body mass index is 29.52 kg/m².    BMI ASSESSMENT/PLAN:  Patient is overweight.    Caloric restriction        See Patient Instructions section.   Return in about 1 year (around 2/28/2024) for Medicare Wellness Visit or sooner if needed.      OUTPATIENT PROGRESS NOTE    Subjective   Chief Complaint Office Visit and Medicare Wellness Visit    HPI  Cyst on finger: affected left index finger DIP first, then now affecting left middle finger DIP. There are 3 cysts that were even bigger last week but they've gotten slightly smaller in size. They are quite painful and fluctuate in size.     Notices some lines on the toenail for the past 2 years. Told by podiatrist it's related to her nutrition. She eats protein, veggies regularly.     Vasomotor symptoms: Hot flashes since total hysterectomy performed February 2009.  Estradiol helps a lot. I cut her dose down from 1 mg daily to 0.25 mg daily. She tried taking it every other day. After the 3rd or 4th day, hot flashes were too much. Back to taking estradiol 0.25 mg daily. Gets 1 hot flash each morning.       Hypothyroidism: Managed by endocrinology.  She is on levothyroxine 50 mcg daily.     Dry skin patches: She uses hydrocortisone 2.5% cream on the eyelid as needed for dryness/itching.  Only needs it for a couple days, then skin back to normal.          Medications  Medications were reviewed and updated today.    Histories  I have personally reviewed and updated the patient's past medical, past surgical, family and social histories during today's visit.    Review of Systems: The patient DENIES symptoms of:  Fever     Objective    Visit Vitals  /78   Pulse 71   Temp 98 °F (36.7 °C) (Oral)   Resp 16   Ht 5' 3\" (1.6 m)   Wt 75.6 kg (166 lb 10.7 oz)   BMI 29.52 kg/m²     Physical Exam  General: Normal development.  Well nourished.  Well groomed.  Eyes: Normal conjunctivae and eyelids.  Neck:  Trachea is midline.  Respiratory:  Normal respiratory effort.  Clear to auscultation bilaterally without wheezes or rales.  Cardiovascular: Extremities warm and well no perfused.  Regular rate and rhythm.  Normal S1, S2 without murmurs.  No lower extremity edema.  Abdomen: Abdomen soft, not tender, not distended.  Skin: left index finger DIP with a couple small nodules,  first, left middle finger DIP with 3 soft mobile nodules, tender to palpation.  Neuro:  Cranial nerves II-XII are grossly intact without focal deficits.  Psychiatric:  Oriented to time, place, and person.  Appropriate mood and affect.  Normal judgment and insight.      Assessment & Plan   Diagnoses and associated orders for this visit:  1. Medicare annual wellness visit, subsequent  2. Epidermoid cyst of finger of left hand  -     SERVICE TO ORTHOPEDICS  3. Vasomotor symptoms due to menopause  -     Estradiol  4. IFG (impaired fasting glucose)  -     Comprehensive Metabolic Panel  -     CBC with Automated Differential  5. Borderline hyperlipidemia  -     CBC with Automated Differential  -     Lipid Panel With Reflex  6. Osteopenia, unspecified location  -     BD DEXA SCAN AXIAL SKELETON  7. Post-menopausal  -     BD DEXA SCAN AXIAL SKELETON    Medicare Wellness Visit:  Advanced directive on file.  Mammogram scheduled.  DEXA due - order placed.  Colon cancer screening up to date.  Immunizations reviewed. Pt declines at this time.    Cyst on fingers: waxes an wanes in size but quite tender. Refer to hand surgeon.    Vasomotor symptoms:  Weaned down to lowest oral estrogen dose she can tolerate. Discussed risks for being on oral estrogen. She will continue to try to ween off as  tolerated.     Impaired fasting glucose:  Check labs.    Hyperlipidemia:  Check labs.    Osteopenia:  DEXA ordered. Discussed importance of regular weightbearing exercise.     Followup:  Return in about 1 year (around 2/28/2024) for Medicare Wellness Visit or sooner if needed.

## 2023-05-01 ENCOUNTER — CLINIC PROCEDURE ONLY (OUTPATIENT)
Dept: URBAN - METROPOLITAN AREA CLINIC 26 | Facility: CLINIC | Age: 73
End: 2023-05-01

## 2023-05-01 DIAGNOSIS — H35.3122: ICD-10-CM

## 2023-05-01 DIAGNOSIS — H35.371: ICD-10-CM

## 2023-05-01 DIAGNOSIS — H35.3211: ICD-10-CM

## 2023-05-01 DIAGNOSIS — D31.31: ICD-10-CM

## 2023-05-01 PROCEDURE — 92250 FUNDUS PHOTOGRAPHY W/I&R: CPT

## 2023-05-01 PROCEDURE — 67028 INJECTION EYE DRUG: CPT

## 2023-05-01 PROCEDURE — 92134 CPTRZ OPH DX IMG PST SGM RTA: CPT

## 2023-05-01 ASSESSMENT — TONOMETRY: OD_IOP_MMHG: 14

## 2023-05-22 ENCOUNTER — TELEPHONE ENCOUNTER (OUTPATIENT)
Dept: URBAN - METROPOLITAN AREA CLINIC 60 | Facility: CLINIC | Age: 73
End: 2023-05-22

## 2023-05-22 RX ORDER — URSODIOL 300 MG/1
1 (ONE) CAPSULE ORAL
Qty: 60 | Refills: 0
Start: 2018-10-19

## 2023-05-26 ENCOUNTER — WEB ENCOUNTER (OUTPATIENT)
Dept: URBAN - METROPOLITAN AREA CLINIC 60 | Facility: CLINIC | Age: 73
End: 2023-05-26

## 2023-05-26 RX ORDER — URSODIOL 300 MG/1
TAKE TWO CAPS IN THE MORNING AND ONE CAP IN THE EVENING CAPSULE ORAL THREE TIMES A DAY
Qty: 270 | Refills: 1
Start: 2021-02-04

## 2023-06-19 ENCOUNTER — CLINIC PROCEDURE ONLY (OUTPATIENT)
Dept: URBAN - METROPOLITAN AREA CLINIC 26 | Facility: CLINIC | Age: 73
End: 2023-06-19

## 2023-06-19 DIAGNOSIS — H35.3122: ICD-10-CM

## 2023-06-19 DIAGNOSIS — H35.3211: ICD-10-CM

## 2023-06-19 DIAGNOSIS — H35.371: ICD-10-CM

## 2023-06-19 DIAGNOSIS — D31.31: ICD-10-CM

## 2023-06-19 PROCEDURE — 67028 INJECTION EYE DRUG: CPT

## 2023-06-19 PROCEDURE — 92134 CPTRZ OPH DX IMG PST SGM RTA: CPT

## 2023-06-19 PROCEDURE — 92250 FUNDUS PHOTOGRAPHY W/I&R: CPT

## 2023-06-19 ASSESSMENT — TONOMETRY: OD_IOP_MMHG: 11

## 2023-06-22 ENCOUNTER — LAB OUTSIDE AN ENCOUNTER (OUTPATIENT)
Dept: URBAN - METROPOLITAN AREA CLINIC 60 | Facility: CLINIC | Age: 73
End: 2023-06-22

## 2023-06-22 ENCOUNTER — OFFICE VISIT (OUTPATIENT)
Dept: URBAN - METROPOLITAN AREA CLINIC 60 | Facility: CLINIC | Age: 73
End: 2023-06-22
Payer: MEDICARE

## 2023-06-22 ENCOUNTER — WEB ENCOUNTER (OUTPATIENT)
Dept: URBAN - METROPOLITAN AREA CLINIC 60 | Facility: CLINIC | Age: 73
End: 2023-06-22

## 2023-06-22 ENCOUNTER — DASHBOARD ENCOUNTERS (OUTPATIENT)
Age: 73
End: 2023-06-22

## 2023-06-22 VITALS
OXYGEN SATURATION: 97 % | WEIGHT: 147 LBS | BODY MASS INDEX: 23.07 KG/M2 | SYSTOLIC BLOOD PRESSURE: 136 MMHG | HEART RATE: 86 BPM | HEIGHT: 67 IN | TEMPERATURE: 97.9 F | DIASTOLIC BLOOD PRESSURE: 70 MMHG

## 2023-06-22 DIAGNOSIS — K74.3 PRIMARY BILIARY CHOLANGITIS: ICD-10-CM

## 2023-06-22 PROBLEM — 31712002: Status: ACTIVE | Noted: 2023-06-22

## 2023-06-22 PROCEDURE — 99214 OFFICE O/P EST MOD 30 MIN: CPT | Performed by: NURSE PRACTITIONER

## 2023-06-22 RX ORDER — AMMONIUM LACTATE 12 %
CREAM (GRAM) TOPICAL TAKE AS DIRECTED
Refills: 0 | Status: ACTIVE | COMMUNITY
Start: 2022-06-02

## 2023-06-22 RX ORDER — LEVOTHYROXINE SODIUM 150 UG/1
TABLET ORAL ONCE A DAY
Refills: 0 | Status: ACTIVE | COMMUNITY
Start: 2022-06-02

## 2023-06-22 RX ORDER — ASPIRIN 325 MG/1
TABLET ORAL ONCE A DAY
Refills: 0 | Status: ACTIVE | COMMUNITY
Start: 2022-06-02

## 2023-06-22 RX ORDER — PNV NO.95/FERROUS FUM/FOLIC AC 28MG-0.8MG
TABLET ORAL
Refills: 0 | Status: ACTIVE | COMMUNITY
Start: 2022-06-02

## 2023-06-22 RX ORDER — URSODIOL 300 MG/1
TAKE TWO CAPS IN THE MORNING AND ONE CAP IN THE EVENING CAPSULE ORAL THREE TIMES A DAY
Qty: 270 | Refills: 1 | Status: ACTIVE | COMMUNITY
Start: 2021-02-04

## 2023-06-22 RX ORDER — LISINOPRIL 30 MG/1
TABLET ORAL ONCE A DAY
Refills: 0 | Status: ACTIVE | COMMUNITY
Start: 2022-06-02

## 2023-06-22 RX ORDER — FLECAINIDE ACETATE 100 MG/1
TABLET ORAL TAKE AS DIRECTED
Refills: 0 | Status: ACTIVE | COMMUNITY
Start: 2022-06-02

## 2023-06-22 RX ORDER — DILTIAZEM HYDROCHLORIDE 240 MG/1
CAPSULE, EXTENDED RELEASE ORAL ONCE A DAY
Refills: 0 | Status: ACTIVE | COMMUNITY
Start: 2022-06-02

## 2023-06-22 RX ORDER — ONDANSETRON 4 MG/1
TAKE AS DIRECTED TAKE FIRST TABLET 30 MIN PRIOR TO STARTING BOWEL PREP, THEN SEND TABLET 30 MIN PRIOR TO SECOND DOSE TABLET, ORALLY DISINTEGRATING ORAL TAKE AS DIRECTED
Refills: 0 | Status: ACTIVE | COMMUNITY
Start: 2019-08-07

## 2023-06-22 RX ORDER — METFORMIN HYDROCHLORIDE 1000 MG/1
1 TABLET WITH A MEAL TABLET, FILM COATED ORAL ONCE A DAY
Refills: 0 | Status: ACTIVE | COMMUNITY
Start: 2022-06-02

## 2023-06-22 RX ORDER — URSODIOL 300 MG/1
TAKE TWO CAPS IN THE MORNING AND ONE CAP IN THE EVENING CAPSULE ORAL AS DIRECTED
Qty: 270 | Refills: 3
Start: 2021-02-04

## 2023-06-22 RX ORDER — HYDROCODONE BITARTRATE AND ACETAMINOPHEN 5; 325 MG/1; MG/1
PRN TABLET ORAL TAKE AS DIRECTED
Refills: 0 | Status: ACTIVE | COMMUNITY
Start: 2022-06-02

## 2023-06-22 RX ORDER — ERGOCALCIFEROL (VITAMIN D2) 10 MCG
TABLET ORAL ONCE A DAY
Refills: 0 | Status: ACTIVE | COMMUNITY
Start: 2022-06-02

## 2023-06-22 RX ORDER — SIMVASTATIN 10 MG/1
TABLET, FILM COATED ORAL ONCE A DAY
Refills: 0 | Status: ACTIVE | COMMUNITY
Start: 2022-06-02

## 2023-06-22 NOTE — HPI-PREVIOUS IMAGING
Liver ultrasound/12 May 2022.  1.  Mild nonspecific hepatic heterogenicity without additional findings.  2.  Benign upper pole right renal cortical cyst at 3.9 cm maximally. ********** FibroScan/08 October 2020.  There is no evidence of hepatic steatosis (S0) or fibrosis (F0/F1). ********** FibroScan/22 July 2019.  Mild steatosis and no fibrosis.  Recommend repeat FibroScan 1 year.

## 2023-06-22 NOTE — HPI-TODAY'S VISIT:
Cynthia Tidwell is a very pleasant 72-year-old female seen in follow-up of primary biliary cholangitis.  Past medical history significant for atrial fibrillation, MVP, hypertension, hyperlipidemia, DM2, hypothyroidism, PVC and diverticulosis.  Past surgical history significant for cholecystectomy, hysterectomy and liver biopsy.  Her last complete colonoscopy was 29 August 2019 and was significant for pandiverticulosis.  She was given a 10-year recall for colorectal cancer screening purposes.  Cynthia presents today for medication refill and surveillance of her liver.  She is asymptomatic from a GI perspective and feels well.  She admits to 1-2 unremarkable bowel movements per day of soft brown stool and denies pyrosis, dysphagia, dyspepsia, abdominal pain, change in bowel habits, rectal bleeding, pam colored stool, irwin-colored urine, melena or unintentional weight loss.

## 2023-06-22 NOTE — HPI-PREVIOUS LABS
Lab work dated 25 May 2023 demonstrates the following abnormalities: Hemoglobin 11.8, CO2 31, glucose 137, hemoglobin A1c 7.6%, triglycerides 178, HDL 46, TSH 5.990, serum albumin 3.7.  All remaining lab values of CBC, CMP, B12, free T4, lipid panel and protein electrophoresis are within normal limits. ********** Lab work dated 19 May 2022 demonstrates the following abnormalities: Hemoglobin 11.6, glucose 113.  All remaining lab values of CBC, CMP, alpha-fetoprotein, ammonia and PT/INR are within normal limits.  Full results listed below. ********** Lab work dated 17 June 2019 demonstrates the following abnormalities: Mitochondrial antibody titer >1:640, mitochondrial antibody screen positive.  All remaining lab values of hepatic function panel, HBV core antibody total, HBV surface antigen, and HCV antibody are negative for within normal limits; full results listed below.

## 2023-06-22 NOTE — HPI-PREVIOUS PROCEDURES
Colonoscopy/29 August 2019.  Pandiverticulosis with internal hemorrhoids present.  No specimens collected.  Recommend 10-year recall for colorectal cancer screening purposes.

## 2023-07-20 ENCOUNTER — LAB OUTSIDE AN ENCOUNTER (OUTPATIENT)
Dept: URBAN - METROPOLITAN AREA CLINIC 63 | Facility: CLINIC | Age: 73
End: 2023-07-20

## 2023-07-20 ENCOUNTER — TELEPHONE ENCOUNTER (OUTPATIENT)
Dept: URBAN - METROPOLITAN AREA CLINIC 63 | Facility: CLINIC | Age: 73
End: 2023-07-20

## 2023-08-14 ENCOUNTER — CLINIC PROCEDURE ONLY (OUTPATIENT)
Dept: URBAN - METROPOLITAN AREA CLINIC 26 | Facility: CLINIC | Age: 73
End: 2023-08-14

## 2023-08-14 DIAGNOSIS — H35.3122: ICD-10-CM

## 2023-08-14 DIAGNOSIS — D31.31: ICD-10-CM

## 2023-08-14 DIAGNOSIS — H35.371: ICD-10-CM

## 2023-08-14 DIAGNOSIS — H35.3211: ICD-10-CM

## 2023-08-14 PROCEDURE — 92134 CPTRZ OPH DX IMG PST SGM RTA: CPT

## 2023-08-14 PROCEDURE — 92250 FUNDUS PHOTOGRAPHY W/I&R: CPT

## 2023-08-14 PROCEDURE — 67028 INJECTION EYE DRUG: CPT

## 2023-08-14 ASSESSMENT — TONOMETRY: OD_IOP_MMHG: 11

## 2023-10-16 ENCOUNTER — FOLLOW UP (OUTPATIENT)
Dept: URBAN - METROPOLITAN AREA CLINIC 26 | Facility: CLINIC | Age: 73
End: 2023-10-16

## 2023-10-16 VITALS — BODY MASS INDEX: 21.66 KG/M2 | WEIGHT: 138 LBS | HEIGHT: 67 IN

## 2023-10-16 DIAGNOSIS — D31.31: ICD-10-CM

## 2023-10-16 DIAGNOSIS — H35.3211: ICD-10-CM

## 2023-10-16 DIAGNOSIS — H35.711: ICD-10-CM

## 2023-10-16 DIAGNOSIS — H35.3122: ICD-10-CM

## 2023-10-16 DIAGNOSIS — E11.9: ICD-10-CM

## 2023-10-16 DIAGNOSIS — H43.813: ICD-10-CM

## 2023-10-16 DIAGNOSIS — H04.123: ICD-10-CM

## 2023-10-16 DIAGNOSIS — H35.371: ICD-10-CM

## 2023-10-16 PROCEDURE — 92134 CPTRZ OPH DX IMG PST SGM RTA: CPT

## 2023-10-16 PROCEDURE — 92250 FUNDUS PHOTOGRAPHY W/I&R: CPT | Mod: 59,59

## 2023-10-16 PROCEDURE — 67028 INJECTION EYE DRUG: CPT

## 2023-10-16 PROCEDURE — 92014 COMPRE OPH EXAM EST PT 1/>: CPT | Mod: 25

## 2023-10-16 ASSESSMENT — VISUAL ACUITY
OS_SC: 20/25-2
OD_SC: CF 1FT

## 2023-10-16 ASSESSMENT — TONOMETRY
OD_IOP_MMHG: 11
OS_IOP_MMHG: 12

## 2024-01-03 ENCOUNTER — CLINIC PROCEDURE ONLY (OUTPATIENT)
Dept: URBAN - METROPOLITAN AREA CLINIC 26 | Facility: CLINIC | Age: 74
End: 2024-01-03

## 2024-01-03 DIAGNOSIS — H35.3211: ICD-10-CM

## 2024-01-03 DIAGNOSIS — H35.3122: ICD-10-CM

## 2024-01-03 PROCEDURE — 67028 INJECTION EYE DRUG: CPT

## 2024-01-03 PROCEDURE — 92250 FUNDUS PHOTOGRAPHY W/I&R: CPT

## 2024-01-03 PROCEDURE — 92134 CPTRZ OPH DX IMG PST SGM RTA: CPT

## 2024-01-03 ASSESSMENT — TONOMETRY: OD_IOP_MMHG: 14

## 2024-03-27 ENCOUNTER — CLINIC PROCEDURE ONLY (OUTPATIENT)
Dept: URBAN - METROPOLITAN AREA CLINIC 26 | Facility: CLINIC | Age: 74
End: 2024-03-27

## 2024-03-27 DIAGNOSIS — H35.3122: ICD-10-CM

## 2024-03-27 DIAGNOSIS — H35.3211: ICD-10-CM

## 2024-03-27 PROCEDURE — 67028 INJECTION EYE DRUG: CPT

## 2024-03-27 PROCEDURE — 92134 CPTRZ OPH DX IMG PST SGM RTA: CPT

## 2024-03-27 PROCEDURE — 92250 FUNDUS PHOTOGRAPHY W/I&R: CPT

## 2024-03-27 ASSESSMENT — TONOMETRY: OD_IOP_MMHG: 14

## 2024-05-14 ENCOUNTER — WEB ENCOUNTER (OUTPATIENT)
Dept: URBAN - METROPOLITAN AREA CLINIC 60 | Facility: CLINIC | Age: 74
End: 2024-05-14

## 2024-05-14 RX ORDER — URSODIOL 300 MG/1
TAKE TWO CAPS IN THE MORNING AND ONE CAP IN THE EVENING CAPSULE ORAL AS DIRECTED
Qty: 270 | Refills: 3
Start: 2021-02-04

## 2024-05-29 ENCOUNTER — CLINIC PROCEDURE ONLY (OUTPATIENT)
Dept: URBAN - METROPOLITAN AREA CLINIC 26 | Facility: CLINIC | Age: 74
End: 2024-05-29

## 2024-05-29 DIAGNOSIS — H35.3211: ICD-10-CM

## 2024-05-29 DIAGNOSIS — H35.3122: ICD-10-CM

## 2024-05-29 PROCEDURE — 92250 FUNDUS PHOTOGRAPHY W/I&R: CPT | Mod: 59

## 2024-05-29 PROCEDURE — 67028 INJECTION EYE DRUG: CPT

## 2024-05-29 PROCEDURE — 92134 CPTRZ OPH DX IMG PST SGM RTA: CPT

## 2024-05-29 ASSESSMENT — TONOMETRY: OD_IOP_MMHG: 11

## 2024-06-01 ENCOUNTER — LAB OUTSIDE AN ENCOUNTER (OUTPATIENT)
Dept: URBAN - METROPOLITAN AREA CLINIC 60 | Facility: CLINIC | Age: 74
End: 2024-06-01

## 2024-06-27 NOTE — PATIENT DISCUSSION
Return in about 4 weeks (around 6/18/2024).      6/17/20 POST FOCAL, WITH ME , OBSERVE MAY NEED FURTHER LASER, IS RELUCTANT ABOUT ANTIVG.

## 2024-08-07 ENCOUNTER — FOLLOW UP (OUTPATIENT)
Dept: URBAN - METROPOLITAN AREA CLINIC 26 | Facility: CLINIC | Age: 74
End: 2024-08-07

## 2024-08-07 VITALS — BODY MASS INDEX: 21.19 KG/M2 | WEIGHT: 135 LBS | HEIGHT: 67 IN

## 2024-08-07 DIAGNOSIS — H25.13: ICD-10-CM

## 2024-08-07 DIAGNOSIS — H35.3211: ICD-10-CM

## 2024-08-07 DIAGNOSIS — D31.31: ICD-10-CM

## 2024-08-07 DIAGNOSIS — H43.813: ICD-10-CM

## 2024-08-07 DIAGNOSIS — H35.371: ICD-10-CM

## 2024-08-07 DIAGNOSIS — H02.833: ICD-10-CM

## 2024-08-07 DIAGNOSIS — H35.711: ICD-10-CM

## 2024-08-07 DIAGNOSIS — H04.123: ICD-10-CM

## 2024-08-07 DIAGNOSIS — E11.9: ICD-10-CM

## 2024-08-07 DIAGNOSIS — H02.836: ICD-10-CM

## 2024-08-07 DIAGNOSIS — H35.3122: ICD-10-CM

## 2024-08-07 PROCEDURE — 67028 INJECTION EYE DRUG: CPT

## 2024-08-07 PROCEDURE — 92250 FUNDUS PHOTOGRAPHY W/I&R: CPT | Mod: 59

## 2024-08-07 PROCEDURE — 92134 CPTRZ OPH DX IMG PST SGM RTA: CPT

## 2024-08-07 PROCEDURE — 92014 COMPRE OPH EXAM EST PT 1/>: CPT

## 2024-08-07 ASSESSMENT — TONOMETRY
OD_IOP_MMHG: 13
OS_IOP_MMHG: 14

## 2024-08-07 ASSESSMENT — VISUAL ACUITY
OD_CC: CF 2FT
OS_CC: 20/30

## 2024-10-21 ENCOUNTER — CLINIC PROCEDURE ONLY (OUTPATIENT)
Dept: URBAN - METROPOLITAN AREA CLINIC 26 | Facility: CLINIC | Age: 74
End: 2024-10-21

## 2024-10-21 DIAGNOSIS — H35.3211: ICD-10-CM

## 2024-10-21 DIAGNOSIS — H35.3122: ICD-10-CM

## 2024-10-21 PROCEDURE — 92134 CPTRZ OPH DX IMG PST SGM RTA: CPT

## 2024-10-21 PROCEDURE — 92250 FUNDUS PHOTOGRAPHY W/I&R: CPT | Mod: 59

## 2024-10-21 PROCEDURE — 67028 INJECTION EYE DRUG: CPT

## 2024-10-21 PROCEDURE — J2777PFS VABYSMO PFS: Mod: JZ

## 2025-01-06 ENCOUNTER — CLINIC PROCEDURE ONLY (OUTPATIENT)
Age: 75
End: 2025-01-06

## 2025-01-06 DIAGNOSIS — H35.3122: ICD-10-CM

## 2025-01-06 DIAGNOSIS — H35.3211: ICD-10-CM

## 2025-01-06 PROCEDURE — 67028 INJECTION EYE DRUG: CPT

## 2025-01-06 PROCEDURE — 92250 FUNDUS PHOTOGRAPHY W/I&R: CPT | Mod: 59

## 2025-01-06 PROCEDURE — 92134 CPTRZ OPH DX IMG PST SGM RTA: CPT

## 2025-01-06 PROCEDURE — J2777PFS VABYSMO PFS: Mod: JZ

## 2025-03-17 ENCOUNTER — CLINIC PROCEDURE ONLY (OUTPATIENT)
Age: 75
End: 2025-03-17

## 2025-03-17 DIAGNOSIS — H35.3211: ICD-10-CM

## 2025-03-17 DIAGNOSIS — H35.3122: ICD-10-CM

## 2025-03-17 PROCEDURE — 92134 CPTRZ OPH DX IMG PST SGM RTA: CPT

## 2025-03-17 PROCEDURE — J2777PFS VABYSMO PFS: Mod: JZ

## 2025-03-17 PROCEDURE — 92250 FUNDUS PHOTOGRAPHY W/I&R: CPT | Mod: 59

## 2025-03-17 PROCEDURE — 67028 INJECTION EYE DRUG: CPT

## 2025-07-01 ENCOUNTER — CLINIC PROCEDURE ONLY (OUTPATIENT)
Age: 75
End: 2025-07-01

## 2025-07-01 DIAGNOSIS — H35.3211: ICD-10-CM

## 2025-07-01 DIAGNOSIS — H35.3122: ICD-10-CM

## 2025-07-01 PROCEDURE — J2777PFS VABYSMO PFS: Mod: JZ

## 2025-07-01 PROCEDURE — 67028 INJECTION EYE DRUG: CPT

## 2025-07-01 PROCEDURE — 92250 FUNDUS PHOTOGRAPHY W/I&R: CPT | Mod: 59

## 2025-07-01 PROCEDURE — 92134 CPTRZ OPH DX IMG PST SGM RTA: CPT

## 2025-08-14 ENCOUNTER — WEB ENCOUNTER (OUTPATIENT)
Dept: URBAN - METROPOLITAN AREA CLINIC 63 | Facility: CLINIC | Age: 75
End: 2025-08-14

## 2025-08-14 RX ORDER — URSODIOL 300 MG/1
TAKE TWO CAPS IN THE MORNING AND ONE CAP IN THE EVENING CAPSULE ORAL AS DIRECTED
Qty: 270 | Refills: 3
Start: 2021-02-04